# Patient Record
Sex: FEMALE | Race: BLACK OR AFRICAN AMERICAN | Employment: STUDENT | URBAN - METROPOLITAN AREA
[De-identification: names, ages, dates, MRNs, and addresses within clinical notes are randomized per-mention and may not be internally consistent; named-entity substitution may affect disease eponyms.]

---

## 2018-01-07 ENCOUNTER — HOSPITAL ENCOUNTER (EMERGENCY)
Age: 23
Discharge: HOME | End: 2018-01-07
Attending: EMERGENCY MEDICINE
Payer: COMMERCIAL

## 2018-01-07 VITALS
WEIGHT: 214.2 LBS | SYSTOLIC BLOOD PRESSURE: 121 MMHG | HEIGHT: 68 IN | RESPIRATION RATE: 16 BRPM | TEMPERATURE: 98.2 F | BODY MASS INDEX: 32.46 KG/M2 | OXYGEN SATURATION: 100 % | DIASTOLIC BLOOD PRESSURE: 90 MMHG | HEART RATE: 82 BPM

## 2018-01-07 DIAGNOSIS — N30.00 ACUTE CYSTITIS WITHOUT HEMATURIA: ICD-10-CM

## 2018-01-07 DIAGNOSIS — K29.90 GASTRITIS AND DUODENITIS: Primary | ICD-10-CM

## 2018-01-07 DIAGNOSIS — F10.10 ALCOHOL ABUSE: ICD-10-CM

## 2018-01-07 LAB
ALBUMIN SERPL-MCNC: 4.3 G/DL (ref 3.5–5)
ALBUMIN/GLOB SERPL: 1 {RATIO} (ref 1.1–2.2)
ALP SERPL-CCNC: 62 U/L (ref 45–117)
ALT SERPL-CCNC: 30 U/L (ref 12–78)
ANION GAP SERPL CALC-SCNC: 10 MMOL/L (ref 5–15)
APPEARANCE UR: ABNORMAL
AST SERPL-CCNC: 16 U/L (ref 15–37)
BACTERIA URNS QL MICRO: ABNORMAL /HPF
BASOPHILS # BLD: 0 K/UL (ref 0–0.1)
BASOPHILS NFR BLD: 0 % (ref 0–1)
BILIRUB SERPL-MCNC: 0.4 MG/DL (ref 0.2–1)
BILIRUB UR QL: NEGATIVE
BUN SERPL-MCNC: 12 MG/DL (ref 6–20)
BUN/CREAT SERPL: 13 (ref 12–20)
CALCIUM SERPL-MCNC: 9 MG/DL (ref 8.5–10.1)
CHLORIDE SERPL-SCNC: 104 MMOL/L (ref 97–108)
CO2 SERPL-SCNC: 24 MMOL/L (ref 21–32)
COLOR UR: ABNORMAL
CREAT SERPL-MCNC: 0.92 MG/DL (ref 0.55–1.02)
EOSINOPHIL # BLD: 0 K/UL (ref 0–0.4)
EOSINOPHIL NFR BLD: 0 % (ref 0–7)
EPITH CASTS URNS QL MICRO: ABNORMAL /LPF
ERYTHROCYTE [DISTWIDTH] IN BLOOD BY AUTOMATED COUNT: 12.4 % (ref 11.5–14.5)
GLOBULIN SER CALC-MCNC: 4.2 G/DL (ref 2–4)
GLUCOSE SERPL-MCNC: 108 MG/DL (ref 65–100)
GLUCOSE UR STRIP.AUTO-MCNC: NEGATIVE MG/DL
HCT VFR BLD AUTO: 40.3 % (ref 35–47)
HEMOCCULT STL QL: NEGATIVE
HGB BLD-MCNC: 13.8 G/DL (ref 11.5–16)
HGB UR QL STRIP: NEGATIVE
KETONES UR QL STRIP.AUTO: NEGATIVE MG/DL
LEUKOCYTE ESTERASE UR QL STRIP.AUTO: ABNORMAL
LYMPHOCYTES # BLD: 1.1 K/UL (ref 0.8–3.5)
LYMPHOCYTES NFR BLD: 9 % (ref 12–49)
MCH RBC QN AUTO: 31.1 PG (ref 26–34)
MCHC RBC AUTO-ENTMCNC: 34.2 G/DL (ref 30–36.5)
MCV RBC AUTO: 90.8 FL (ref 80–99)
MONOCYTES # BLD: 0.4 K/UL (ref 0–1)
MONOCYTES NFR BLD: 3 % (ref 5–13)
MUCOUS THREADS URNS QL MICRO: ABNORMAL /LPF
NEUTS SEG # BLD: 10 K/UL (ref 1.8–8)
NEUTS SEG NFR BLD: 88 % (ref 32–75)
NITRITE UR QL STRIP.AUTO: NEGATIVE
PH UR STRIP: 7.5 [PH] (ref 5–8)
PLATELET # BLD AUTO: 297 K/UL (ref 150–400)
POTASSIUM SERPL-SCNC: 3.9 MMOL/L (ref 3.5–5.1)
PROT SERPL-MCNC: 8.5 G/DL (ref 6.4–8.2)
PROT UR STRIP-MCNC: 30 MG/DL
RBC # BLD AUTO: 4.44 M/UL (ref 3.8–5.2)
RBC #/AREA URNS HPF: ABNORMAL /HPF (ref 0–5)
SODIUM SERPL-SCNC: 138 MMOL/L (ref 136–145)
SP GR UR REFRACTOMETRY: 1.03 (ref 1–1.03)
UR CULT HOLD, URHOLD: NORMAL
UROBILINOGEN UR QL STRIP.AUTO: 0.2 EU/DL (ref 0.2–1)
WBC # BLD AUTO: 11.6 K/UL (ref 3.6–11)
WBC URNS QL MICRO: ABNORMAL /HPF (ref 0–4)

## 2018-01-07 PROCEDURE — 85025 COMPLETE CBC W/AUTO DIFF WBC: CPT | Performed by: EMERGENCY MEDICINE

## 2018-01-07 PROCEDURE — 74011000250 HC RX REV CODE- 250: Performed by: EMERGENCY MEDICINE

## 2018-01-07 PROCEDURE — 87086 URINE CULTURE/COLONY COUNT: CPT | Performed by: EMERGENCY MEDICINE

## 2018-01-07 PROCEDURE — 36415 COLL VENOUS BLD VENIPUNCTURE: CPT | Performed by: EMERGENCY MEDICINE

## 2018-01-07 PROCEDURE — 96374 THER/PROPH/DIAG INJ IV PUSH: CPT

## 2018-01-07 PROCEDURE — 99283 EMERGENCY DEPT VISIT LOW MDM: CPT

## 2018-01-07 PROCEDURE — 96361 HYDRATE IV INFUSION ADD-ON: CPT

## 2018-01-07 PROCEDURE — 81001 URINALYSIS AUTO W/SCOPE: CPT | Performed by: EMERGENCY MEDICINE

## 2018-01-07 PROCEDURE — C9113 INJ PANTOPRAZOLE SODIUM, VIA: HCPCS | Performed by: EMERGENCY MEDICINE

## 2018-01-07 PROCEDURE — 74011250637 HC RX REV CODE- 250/637: Performed by: EMERGENCY MEDICINE

## 2018-01-07 PROCEDURE — 80053 COMPREHEN METABOLIC PANEL: CPT | Performed by: EMERGENCY MEDICINE

## 2018-01-07 PROCEDURE — 87147 CULTURE TYPE IMMUNOLOGIC: CPT | Performed by: EMERGENCY MEDICINE

## 2018-01-07 PROCEDURE — 82272 OCCULT BLD FECES 1-3 TESTS: CPT | Performed by: EMERGENCY MEDICINE

## 2018-01-07 PROCEDURE — 74011250636 HC RX REV CODE- 250/636: Performed by: EMERGENCY MEDICINE

## 2018-01-07 RX ORDER — CEPHALEXIN 500 MG/1
500 CAPSULE ORAL
Status: COMPLETED | OUTPATIENT
Start: 2018-01-07 | End: 2018-01-07

## 2018-01-07 RX ORDER — CEPHALEXIN 500 MG/1
500 CAPSULE ORAL 2 TIMES DAILY
Qty: 10 CAP | Refills: 0 | Status: SHIPPED | OUTPATIENT
Start: 2018-01-07 | End: 2020-09-27 | Stop reason: ALTCHOICE

## 2018-01-07 RX ADMIN — CEPHALEXIN 500 MG: 500 CAPSULE ORAL at 22:21

## 2018-01-07 RX ADMIN — SODIUM CHLORIDE 40 MG: 9 INJECTION INTRAMUSCULAR; INTRAVENOUS; SUBCUTANEOUS at 21:01

## 2018-01-07 RX ADMIN — SODIUM CHLORIDE 1000 ML: 900 INJECTION, SOLUTION INTRAVENOUS at 21:01

## 2018-01-08 NOTE — ED TRIAGE NOTES
Pt reports onset of vomiting, diarrhea, and bright red blood in stool today. C/o bilateral lower abd cramping, feeling faint and unable to keep any food/ fluids down.

## 2018-01-08 NOTE — ED NOTES
2150: MD and RN at bedside for stool occult  2225: MD reviewed discharge instructions and options with patient and patient verbalized understanding. RN reviewed discharge instructions using teachback method. Pt ambulated to exit without difficulty and in no signs of acute distress escorted by self who will drive home. No complaints or needs expressed at this time. Patient was counseled on medications prescribed at discharge. VSS at time of discharge. Pt to call GI in the morning for follow up.

## 2018-01-08 NOTE — DISCHARGE INSTRUCTIONS
Gastritis: Care Instructions  Your Care Instructions    Gastritis is a sore and upset stomach. It happens when something irritates the stomach lining. Many things can cause it. These include an infection such as the flu or something you ate or drank. Medicines or a sore on the lining of the stomach (ulcer) also can cause it. Your belly may bloat and ache. You may belch, vomit, and feel sick to your stomach. You should be able to relieve the problem by taking medicine. And it may help to change your diet. If gastritis lasts, your doctor may prescribe medicine. Follow-up care is a key part of your treatment and safety. Be sure to make and go to all appointments, and call your doctor if you are having problems. It's also a good idea to know your test results and keep a list of the medicines you take. How can you care for yourself at home? · If your doctor prescribed antibiotics, take them as directed. Do not stop taking them just because you feel better. You need to take the full course of antibiotics. · Be safe with medicines. If your doctor prescribed medicine to decrease stomach acid, take it as directed. Call your doctor if you think you are having a problem with your medicine. · Do not take any other medicine, including over-the-counter pain relievers, without talking to your doctor first.  · If your doctor recommends over-the-counter medicine to reduce stomach acid, such as Pepcid AC, Prilosec, Tagamet HB, or Zantac 75, follow the directions on the label. · Drink plenty of fluids (enough so that your urine is light yellow or clear like water) to prevent dehydration. Choose water and other caffeine-free clear liquids. If you have kidney, heart, or liver disease and have to limit fluids, talk with your doctor before you increase the amount of fluids you drink. · Limit how much alcohol you drink. · Avoid coffee, tea, cola drinks, chocolate, and other foods with caffeine.  They increase stomach acid.  When should you call for help? Call 911 anytime you think you may need emergency care. For example, call if:  ? · You vomit blood or what looks like coffee grounds. ? · You pass maroon or very bloody stools. ?Call your doctor now or seek immediate medical care if:  ? · You start breathing fast and have not produced urine in the last 8 hours. ? · You cannot keep fluids down. ? Watch closely for changes in your health, and be sure to contact your doctor if:  ? · You do not get better as expected. Where can you learn more? Go to http://juancarlos-flor.info/. Enter [id number] in the search box to learn more about \"Gastritis: Care Instructions. \"  Current as of: May 12, 2017  Content Version: 11.4  © 6874-1441 [employer]. Care instructions adapted under license by [employer] (which disclaims liability or warranty for this information). If you have questions about a medical condition or this instruction, always ask your healthcare professional. [address] any warranty or liability for your use of this information.

## 2018-01-08 NOTE — ED PROVIDER NOTES
HPI Comments: 25 y.o. female with past medical history significant for gastritis, depression, anxiety, and esophageal ulcer who presents with chief complaint of vomiting. The pt c/o nausea and vomiting that have been ongoing today with associated abdominal pain and 3 episodes of bright red bloody stool. The pt explains that she has a known history of gastritis and she had a endoscopy a few years ago for the same. The pt reports that she was treated for GERD after the endoscopy. The pt states that she was instructed to stop ETOH use after the endoscopy. The pt admits that she drank a large amount of tequila last night. There are no other acute medical concerns at this time. Social hx: Nonsmoker. Current ETOH use. PCP: Hamilton Snider MD    Note written by Connor Gifford, as dictated by Familia Bruno MD 8:40 PM     The history is provided by the patient. No  was used. Past Medical History:   Diagnosis Date    Anxiety     Depression     Esophageal ulcer     Gastrointestinal disorder     \"Colon problem as a kid\"       Past Surgical History:   Procedure Laterality Date    HX TYMPANOSTOMY           History reviewed. No pertinent family history. Social History     Social History    Marital status: SINGLE     Spouse name: N/A    Number of children: N/A    Years of education: N/A     Occupational History    Not on file. Social History Main Topics    Smoking status: Never Smoker    Smokeless tobacco: Never Used    Alcohol use Yes    Drug use: Not on file    Sexual activity: Not on file     Other Topics Concern    Not on file     Social History Narrative         ALLERGIES: Citrus and derivatives    Review of Systems   Constitutional: Negative for activity change, chills and fever. HENT: Negative for nosebleeds, sore throat, trouble swallowing and voice change. Eyes: Negative for visual disturbance. Respiratory: Negative for shortness of breath. Cardiovascular: Negative for chest pain and palpitations. Gastrointestinal: Positive for abdominal pain, blood in stool (3 episodes of bright red blood), nausea and vomiting. Negative for constipation and diarrhea. Genitourinary: Negative for difficulty urinating, dysuria, hematuria and urgency. Musculoskeletal: Negative for back pain, neck pain and neck stiffness. Skin: Negative for color change. Allergic/Immunologic: Negative for immunocompromised state. Neurological: Negative for dizziness, seizures, syncope, weakness, light-headedness, numbness and headaches. Psychiatric/Behavioral: Negative for behavioral problems, confusion, hallucinations, self-injury and suicidal ideas. All other systems reviewed and are negative. Vitals:    01/07/18 1919   BP: 141/86   Pulse: 73   Resp: 16   Temp: 98 °F (36.7 °C)   SpO2: 100%   Weight: 97.2 kg (214 lb 3.2 oz)   Height: 5' 8\" (1.727 m)            Physical Exam   Constitutional: She is oriented to person, place, and time. She appears well-developed and well-nourished. No distress. HENT:   Head: Normocephalic and atraumatic. Eyes: Pupils are equal, round, and reactive to light. Neck: Normal range of motion. Neck supple. Cardiovascular: Normal rate, regular rhythm and normal heart sounds. Exam reveals no gallop and no friction rub. No murmur heard. Pulmonary/Chest: Effort normal and breath sounds normal. No respiratory distress. She has no wheezes. Abdominal: Soft. Bowel sounds are normal. She exhibits no distension. There is tenderness (diffuse). There is no rebound and no guarding. Genitourinary:   Genitourinary Comments: No nan blood on the rectal exam.   Musculoskeletal: Normal range of motion. Neurological: She is alert and oriented to person, place, and time. Skin: Skin is warm. No rash noted. She is not diaphoretic. Psychiatric: She has a normal mood and affect.  Her behavior is normal. Judgment and thought content normal. Nursing note and vitals reviewed. Note written by Connor Valadez, as dictated by Delia Rueda MD 8:40 PM     [facility]  ED Course     This is a 69-year-old female with past medical history, review of systems, physical exam as above, presenting with complaints of abdominal pain, nausea, vomiting, regular blood per rectum, following an episode of binge alcohol use last night. The patient endorses a previous history of gastritis, previous endoscopy, physical exam remarkable for well-appearing female, with mildly diffuse tenderness to palpation, without rebound or guarding. Plan to provide IV fluids, antiemetics, obtain CMP, CBC, UA, perform rectal exam. We will make a disposition based on the patient's diagnostics and response to therapy. Procedures    10:17 PM  Patient with unremarkable rectal exam, lab work wnl. Likely gastritis exacerbation 2/2 alcohol use. Will DC patient home with GI follow up, return precautions, and ETOH counseling.

## 2018-01-09 LAB
BACTERIA SPEC CULT: NORMAL
CC UR VC: NORMAL
SERVICE CMNT-IMP: NORMAL

## 2020-05-22 ENCOUNTER — HOSPITAL ENCOUNTER (EMERGENCY)
Age: 25
Discharge: HOME | End: 2020-05-22
Attending: STUDENT IN AN ORGANIZED HEALTH CARE EDUCATION/TRAINING PROGRAM
Payer: COMMERCIAL

## 2020-05-22 VITALS
OXYGEN SATURATION: 100 % | BODY MASS INDEX: 25.46 KG/M2 | HEART RATE: 93 BPM | WEIGHT: 168 LBS | HEIGHT: 68 IN | RESPIRATION RATE: 16 BRPM | TEMPERATURE: 99.2 F

## 2020-05-22 DIAGNOSIS — K14.9 TONGUE IRRITATION: Primary | ICD-10-CM

## 2020-05-22 PROCEDURE — 99282 EMERGENCY DEPT VISIT SF MDM: CPT

## 2020-05-22 PROCEDURE — 74011250637 HC RX REV CODE- 250/637: Performed by: NURSE PRACTITIONER

## 2020-05-22 RX ORDER — DEXAMETHASONE SODIUM PHOSPHATE 10 MG/ML
10 INJECTION INTRAMUSCULAR; INTRAVENOUS
Status: COMPLETED | OUTPATIENT
Start: 2020-05-22 | End: 2020-05-22

## 2020-05-22 RX ADMIN — DEXAMETHASONE SODIUM PHOSPHATE 10 MG: 10 INJECTION, SOLUTION INTRAMUSCULAR; INTRAVENOUS at 15:38

## 2020-05-22 NOTE — ED TRIAGE NOTES
Pt states the right side of her tongue has been swollen x 5 days associated with itching on right side of throat. Pt was seen at [facility] for the same. Pt speaking in full sentences without muffled voice and able to handle own secretions.

## 2020-05-22 NOTE — ED PROVIDER NOTES
This is a 52-year-old female with past medical history of anxiety/depression and esophageal ulcer who presents to the ER today for evaluation of right sided tongue \"numbness \" and sore throat. Patient stated that her symptoms started several days ago, when she presented to an urgent care for the evaluation of her symptoms. She stated that they gave her a prescription for amoxicillin and Benadryl for her symptoms. She reports being on day 3, with mild resolution of her discomfort. She specifically denies loss of taste and agnosia, but that the right side of her tongue still feels tingly. She denies having history of similar symptoms, and denies any trauma or injury to the tongue, including biting or exposure to hot or cold liquids. Her only other complaint is a mild gritty sensation in her right eye. She denies visual changes, exudate, injury, diplopia, or sensation of a foreign body. She also denies fever, chills, pain or difficulty swallowing, abdominal pain, and skin rash           Past Medical History:   Diagnosis Date    Anxiety     Depression     Esophageal ulcer     Gastrointestinal disorder     \"Colon problem as a kid\"       Past Surgical History:   Procedure Laterality Date    HX TYMPANOSTOMY           No family history on file. Social History     Socioeconomic History    Marital status: SINGLE     Spouse name: Not on file    Number of children: Not on file    Years of education: Not on file    Highest education level: Not on file   Occupational History    Not on file   Social Needs    Financial resource strain: Not on file    Food insecurity     Worry: Not on file     Inability: Not on file    Transportation needs     Medical: Not on file     Non-medical: Not on file   Tobacco Use    Smoking status: Never Smoker    Smokeless tobacco: Never Used   Substance and Sexual Activity    Alcohol use:  Yes    Drug use: Not on file    Sexual activity: Not on file   Lifestyle    Physical activity     Days per week: Not on file     Minutes per session: Not on file    Stress: Not on file   Relationships    Social connections     Talks on phone: Not on file     Gets together: Not on file     Attends Spiritism service: Not on file     Active member of club or organization: Not on file     Attends meetings of clubs or organizations: Not on file     Relationship status: Not on file    Intimate partner violence     Fear of current or ex partner: Not on file     Emotionally abused: Not on file     Physically abused: Not on file     Forced sexual activity: Not on file   Other Topics Concern    Not on file   Social History Narrative    Not on file         ALLERGIES: Citrus and derivatives    Review of Systems   Constitutional: Negative. HENT: Positive for rhinorrhea and sore throat. Negative for congestion, dental problem, ear discharge, ear pain, facial swelling, hearing loss, mouth sores, sinus pain and trouble swallowing. Eyes: Positive for itching. Negative for photophobia, pain, discharge, redness and visual disturbance. Respiratory: Negative for chest tightness and shortness of breath. Cardiovascular: Negative. Gastrointestinal: Negative. Endocrine: Negative. Genitourinary: Negative. Musculoskeletal: Negative. Neurological: Negative. Vitals:    05/22/20 1445   Pulse: 93   Resp: 16   Temp: 99.2 °F (37.3 °C)   SpO2: 100%   Weight: 76.2 kg (168 lb)   Height: 5' 8\" (1.727 m)            Physical Exam  Vitals signs and nursing note reviewed. Constitutional:       Appearance: She is well-developed and normal weight. HENT:      Right Ear: Tympanic membrane normal.      Left Ear: Tympanic membrane normal.      Nose: Rhinorrhea present. Mouth/Throat:      Pharynx: Oropharynx is clear. Uvula midline. Tonsils: 2+ on the right. 2+ on the left. Eyes:      Conjunctiva/sclera: Conjunctivae normal.      Pupils: Pupils are equal, round, and reactive to light. Neck:      Musculoskeletal: Normal range of motion and neck supple. Thyroid: No thyromegaly. Cardiovascular:      Rate and Rhythm: Normal rate and regular rhythm. Pulmonary:      Effort: Pulmonary effort is normal.      Breath sounds: Normal breath sounds. Abdominal:      General: Bowel sounds are normal.      Palpations: Abdomen is soft. Lymphadenopathy:      Cervical: No cervical adenopathy. Skin:     General: Skin is warm and dry. Neurological:      General: No focal deficit present. Mental Status: She is alert. Psychiatric:         Mood and Affect: Mood normal.         Behavior: Behavior normal.          MDM        VITAL SIGNS:    Patient Vitals for the past 12 hrs:   Temp Pulse Resp BP SpO2   05/22/20 1445 99.2 °F (37.3 °C) 93 16  100 %         Medications During Visit:  Medications   dexamethasone (PF) (DECADRON) 10 mg/mL injection 10 mg (10 mg Oral Given 5/22/20 1538)       DECISION MAKING:  Sonya Chin is a 26 y.o. female who comes in as above. Her chief complaint is related to sensory disturbances felt on the right side of her tongue. She states that the right side of her tongue feels \"numb\", however, she was able to differentiate sharp versus dull tactile stimulation on the affected area. She explicitly denied agnosia or loss of taste, making an atypical COVID-19 infection very unlikely. She also reported a gritty feeling in the right eye, which points more towards the diagnosis of a viral conjunctivitis/viral ENT syndrome. She had no cervical adenopathy and her oropharynx was mildly erythematous without any patches, exudate, or visualized peritonsillar abscess or displaced uvula. She also exhibited no odynophagia or dysphagia. She agreed to the treatment plan of continuing to take her prescribed amoxicillin and taking one-time dose of Decadron in the emergency department today.   I instructed her to follow-up with her primary care doctor at the earliest time she can make an appointment, seeing as I could not give her a definitive answer for the abnormal sensation on her tongue. However, based on my history and physical exam, I do not think any other tests would have benefited the patient in a diagnostic or prognostic way. IMPRESSION:  1. Tongue irritation        DISPOSITION:  Discharged      Discharge Medication List as of 5/22/2020  4:02 PM           Follow-up Information     Follow up With Specialties Details Why Contact Info    Maritza Holter, PA-C Physician Assistant  As needed, If symptoms worsen [address] [place] [address] [phone]            The patient is asked to follow-up with their primary care provider in the next several days. They are to call tomorrow for an appointment. The patient is asked to return promptly for any increased concerns or worsening of symptoms. They can return to this emergency department or any other emergency department.

## 2020-05-22 NOTE — DISCHARGE INSTRUCTIONS
We gave you a \"steroid\" that can help with inflammation for several days. Please finish your Amoxicillin as prescribed. Please take motrin or tylenol as needed for discomfort / pain. Rinse mouth with warm salt water daily.

## 2020-07-30 LAB
ANTIBODY SCREEN, EXTERNAL: NEGATIVE
CHLAMYDIA, EXTERNAL: NEGATIVE
HBSAG, EXTERNAL: NEGATIVE
HIV, EXTERNAL: NEGATIVE
N. GONORRHEA, EXTERNAL: NEGATIVE
RUBELLA, EXTERNAL: NORMAL
T. PALLIDUM, EXTERNAL: NEGATIVE
TYPE, ABO & RH, EXTERNAL: NORMAL

## 2020-09-27 ENCOUNTER — HOSPITAL ENCOUNTER (EMERGENCY)
Age: 25
Discharge: HOME | End: 2020-09-27
Attending: EMERGENCY MEDICINE | Admitting: EMERGENCY MEDICINE
Payer: COMMERCIAL

## 2020-09-27 VITALS
TEMPERATURE: 98.2 F | DIASTOLIC BLOOD PRESSURE: 76 MMHG | SYSTOLIC BLOOD PRESSURE: 132 MMHG | WEIGHT: 182.5 LBS | OXYGEN SATURATION: 100 % | HEART RATE: 92 BPM | HEIGHT: 67 IN | BODY MASS INDEX: 28.64 KG/M2 | RESPIRATION RATE: 16 BRPM

## 2020-09-27 DIAGNOSIS — N89.8 VAGINAL DISCHARGE DURING PREGNANCY, ANTEPARTUM: Primary | ICD-10-CM

## 2020-09-27 DIAGNOSIS — N76.0 BV (BACTERIAL VAGINOSIS): ICD-10-CM

## 2020-09-27 DIAGNOSIS — O26.899 VAGINAL DISCHARGE DURING PREGNANCY, ANTEPARTUM: Primary | ICD-10-CM

## 2020-09-27 DIAGNOSIS — B96.89 BV (BACTERIAL VAGINOSIS): ICD-10-CM

## 2020-09-27 LAB
APPEARANCE UR: CLEAR
BACTERIA URNS QL MICRO: NEGATIVE /HPF
BILIRUB UR QL: NEGATIVE
CLUE CELLS VAG QL WET PREP: NORMAL
COLOR UR: NORMAL
EPITH CASTS URNS QL MICRO: NORMAL /LPF
GLUCOSE UR STRIP.AUTO-MCNC: NEGATIVE MG/DL
HGB UR QL STRIP: NEGATIVE
HYALINE CASTS URNS QL MICRO: NORMAL /LPF (ref 0–5)
KETONES UR QL STRIP.AUTO: NEGATIVE MG/DL
KOH PREP SPEC: NORMAL
LEUKOCYTE ESTERASE UR QL STRIP.AUTO: NEGATIVE
NITRITE UR QL STRIP.AUTO: NEGATIVE
PH UR STRIP: 5.5 [PH] (ref 5–8)
PROT UR STRIP-MCNC: NEGATIVE MG/DL
RBC #/AREA URNS HPF: NORMAL /HPF (ref 0–5)
SERVICE CMNT-IMP: NORMAL
SP GR UR REFRACTOMETRY: 1.02 (ref 1–1.03)
T VAGINALIS VAG QL WET PREP: NORMAL
UR CULT HOLD, URHOLD: NORMAL
UROBILINOGEN UR QL STRIP.AUTO: 0.2 EU/DL (ref 0.2–1)
WBC URNS QL MICRO: NORMAL /HPF (ref 0–4)

## 2020-09-27 PROCEDURE — 99283 EMERGENCY DEPT VISIT LOW MDM: CPT

## 2020-09-27 PROCEDURE — 87210 SMEAR WET MOUNT SALINE/INK: CPT

## 2020-09-27 PROCEDURE — 87491 CHLMYD TRACH DNA AMP PROBE: CPT

## 2020-09-27 PROCEDURE — 36415 COLL VENOUS BLD VENIPUNCTURE: CPT

## 2020-09-27 PROCEDURE — 81001 URINALYSIS AUTO W/SCOPE: CPT

## 2020-09-28 LAB
C TRACH DNA SPEC QL NAA+PROBE: NEGATIVE
N GONORRHOEA DNA SPEC QL NAA+PROBE: NEGATIVE
SAMPLE TYPE: NORMAL
SERVICE CMNT-IMP: NORMAL
SPECIMEN SOURCE: NORMAL

## 2020-09-28 NOTE — ED PROVIDER NOTES
Meghan Galvez is a 26 y.o. (ab) female 16 weeks pregnant, with no significantPMH presents to emergency room ambulatory for evaluation of low back pain, green vaginal discharge, without odor x today. She denies fever, chills, vomiting, urinary sx's. No concerns for STI, no vaginal bleeding. Has had intermittent BV this pregnancy, states having an odor, on Metrogel twice a week \"for the duration of my pregnancy\", last dose Wed, due for another dose tonight. States discharge tonight had no odor. Also tested for micoplasma and ureaplasma, states given a \"one time dose\", states she thinks she took azithromycin on . She is being actively followed by OB on a 1-2 weekly basis for the above problems and has follow-up this coming week. OB: Christiana Gandhi  PCP: Leonor, [address] [place] [address]    Surgical hx- see chart  Social hx- see chart    The patient has no other complaints at this time. Past Medical History:   Diagnosis Date    Anxiety     Depression     Esophageal ulcer     Gastrointestinal disorder     \"Colon problem as a kid\"       Past Surgical History:   Procedure Laterality Date    HX TYMPANOSTOMY           History reviewed. No pertinent family history. Social History     Socioeconomic History    Marital status: SINGLE     Spouse name: Not on file    Number of children: Not on file    Years of education: Not on file    Highest education level: Not on file   Occupational History    Not on file   Social Needs    Financial resource strain: Not on file    Food insecurity     Worry: Not on file     Inability: Not on file    Transportation needs     Medical: Not on file     Non-medical: Not on file   Tobacco Use    Smoking status: Never Smoker    Smokeless tobacco: Never Used   Substance and Sexual Activity    Alcohol use:  Yes    Drug use: Not on file    Sexual activity: Not on file   Lifestyle    Physical activity     Days per week: Not on file     Minutes per session: Not on file    Stress: Not on file   Relationships    Social connections     Talks on phone: Not on file     Gets together: Not on file     Attends Sabianist service: Not on file     Active member of club or organization: Not on file     Attends meetings of clubs or organizations: Not on file     Relationship status: Not on file    Intimate partner violence     Fear of current or ex partner: Not on file     Emotionally abused: Not on file     Physically abused: Not on file     Forced sexual activity: Not on file   Other Topics Concern    Not on file   Social History Narrative    Not on file         ALLERGIES: Citrus and derivatives and Seafood    Review of Systems   Constitutional: Negative. Negative for activity change, chills, fatigue and unexpected weight change. HENT: Negative for trouble swallowing. Respiratory: Negative for cough, chest tightness, shortness of breath and wheezing. Cardiovascular: Negative. Negative for chest pain and palpitations. Gastrointestinal: Negative. Negative for abdominal pain, diarrhea, nausea and vomiting. Genitourinary: Positive for vaginal discharge. Negative for dysuria, flank pain, frequency and hematuria. Musculoskeletal: Negative. Negative for arthralgias, back pain, neck pain and neck stiffness. Skin: Negative. Negative for color change and rash. Neurological: Negative. Negative for dizziness, numbness and headaches. All other systems reviewed and are negative. Vitals:    09/27/20 2029   BP: 130/84   Pulse: 97   Resp: 16   Temp: 98.3 °F (36.8 °C)   SpO2: 100%   Weight: 82.8 kg (182 lb 8 oz)   Height: 5' 7\" (1.702 m)            Physical Exam  Vitals signs and nursing note reviewed. Constitutional:       General: She is not in acute distress. Appearance: She is well-developed. She is not toxic-appearing or diaphoretic. Comments: AA female, well-appearing   HENT:      Head: Normocephalic and atraumatic.    Eyes:      General: Right eye: No discharge. Left eye: No discharge. Conjunctiva/sclera: Conjunctivae normal.      Pupils: Pupils are equal, round, and reactive to light. Neck:      Musculoskeletal: Full passive range of motion without pain and normal range of motion. Trachea: No tracheal tenderness. Cardiovascular:      Rate and Rhythm: Normal rate and regular rhythm. Pulses: Normal pulses. Heart sounds: Normal heart sounds. No murmur. No friction rub. No gallop. Pulmonary:      Effort: Pulmonary effort is normal. No respiratory distress. Breath sounds: Normal breath sounds. No wheezing or rales. Chest:      Chest wall: No tenderness. Abdominal:      General: Bowel sounds are normal. There is no distension. Palpations: Abdomen is soft. Tenderness: There is no abdominal tenderness. There is no guarding or rebound. Comments: Gravid just below umbilicus; abd soft and non-tender   Genitourinary:     Comments: Normal external genitalia. Vagina- pink, moist without lesion. MOderate amount of thick white creamy discharge. Cervix- pink, round with a closed cervical os. No CMT. No adnexal TTP or masses. Musculoskeletal: Normal range of motion. General: No tenderness. Skin:     General: Skin is warm and dry. Capillary Refill: Capillary refill takes less than 2 seconds. Findings: No abrasion, erythema or rash. Neurological:      Mental Status: She is alert and oriented to person, place, and time. Cranial Nerves: No cranial nerve deficit. Sensory: No sensory deficit.       Coordination: Coordination normal.   Psychiatric:         Speech: Speech normal.         Behavior: Behavior normal.          MDM  Number of Diagnoses or Management Options  BV (bacterial vaginosis):   Vaginal discharge during pregnancy, antepartum:   Diagnosis management comments:   Ddx: UTI, PID, STI, BV, yeast infection       Amount and/or Complexity of Data Reviewed  Review and summarize past medical records: yes    Patient Progress  Patient progress: stable         Procedures    I discussed patient's PMH, exam findings as well as careplan with the ER attending who agrees with care plan. Kaycee Najera PA-C    Procedure Note - Pelvic Exam:    10:29 PM  Performed by: Kaycee Najera PA-C  Chaperoned by: primary nurse  Pelvic exam was performed using bimanual and speculum. Further findings noted in physical exam.   The procedure took 1-15 minutes, and pt tolerated well.    + BV, admits to chronic BV during this pregnancy, being actively managed by OB for this, on Metrogel twice a week, due for another dose today (last dose was Wed) and has close f/u with OB this week. UA without bacteria or other signs of infection. No CMT on exam, defers offer for empiric treatment for STI, will await cultures. No fever, vomiting.  per nurse. No vaginal bleeding , cramping or pain. Plan for her to call OB later today to discuss further management. Discussed with Dr. Giovanny Rangel who agrees with care plan and okay with discharge. LABORATORY TESTS:  Recent Results (from the past 12 hour(s))   URINALYSIS W/MICROSCOPIC    Collection Time: 09/27/20 10:13 PM   Result Value Ref Range    Color YELLOW/STRAW      Appearance CLEAR CLEAR      Specific gravity 1.018 1.003 - 1.030      pH (UA) 5.5 5.0 - 8.0      Protein Negative NEG mg/dL    Glucose Negative NEG mg/dL    Ketone Negative NEG mg/dL    Bilirubin Negative NEG      Blood Negative NEG      Urobilinogen 0.2 0.2 - 1.0 EU/dL    Nitrites Negative NEG      Leukocyte Esterase Negative NEG      WBC 0-4 0 - 4 /hpf    RBC 0-5 0 - 5 /hpf    Epithelial cells FEW FEW /lpf    Bacteria Negative NEG /hpf    Hyaline cast 0-2 0 - 5 /lpf   URINE CULTURE HOLD SAMPLE    Collection Time: 09/27/20 10:13 PM    Specimen: Serum; Urine   Result Value Ref Range    Urine culture hold        Urine on hold in Microbiology dept for 2 days.   If unpreserved urine is submitted, it cannot be used for addtional testing after 24 hours, recollection will be required. WET PREP    Collection Time: 09/27/20 10:13 PM    Specimen: Miscellaneous sample   Result Value Ref Range    Clue cells CLUE CELLS PRESENT      Wet prep NO TRICHOMONAS SEEN     KOH, OTHER SOURCES    Collection Time: 09/27/20 10:13 PM    Specimen: Vaginal Specimen; Other   Result Value Ref Range    Special Requests: NO SPECIAL REQUESTS      KOH NO YEAST SEEN             DISCHARGE NOTE:  11:29 PM  The patient's results have been reviewed with them and/or available family. Patient and/or family verbally conveyed their understanding and agreement of the patient's signs, symptoms, diagnosis, treatment and prognosis and additionally agree to follow up as recommended in the discharge instructions or to return to the Emergency Room should their condition change prior to their follow-up appointment. The patient/family verbally agrees with the care-plan and verbally conveys that all of their questions have been answered. The discharge instructions have also been provided to the patient and/or family with some educational information regarding the patient's diagnosis as well a list of reasons why the patient would want to return to the ER prior to their follow-up appointment, should their condition change. Plan:  1. F/U with OB tomorrow  2. Continue Metrogel as recommended by OB  3.  Return precautions discussed and advised to return to ER if worse

## 2020-09-28 NOTE — DISCHARGE INSTRUCTIONS
Patient Education        Bacterial Vaginosis: Care Instructions  Overview     Bacterial vaginosis is a type of vaginal infection. It is caused by excess growth of certain bacteria that are normally found in the vagina. Symptoms can include itching, swelling, pain when you urinate or have sex, and a gray or yellow discharge with a \"fishy\" odor. It is not considered an infection that is spread through sexual contact. Symptoms can be annoying and uncomfortable. But bacterial vaginosis does not usually cause other health problems. However, if you have it while you are pregnant, it can cause complications. While the infection may go away on its own, most doctors use antibiotics to treat it. You may have been prescribed pills or vaginal cream. With treatment, bacterial vaginosis usually clears up in 5 to 7 days. Follow-up care is a key part of your treatment and safety. Be sure to make and go to all appointments, and call your doctor if you are having problems. It's also a good idea to know your test results and keep a list of the medicines you take. How can you care for yourself at home? · Take your antibiotics as directed. Do not stop taking them just because you feel better. You need to take the full course of antibiotics. · Do not eat or drink anything that contains alcohol if you are taking metronidazole or tinidazole. · Keep using your medicine if you start your period. Use pads instead of tampons while using a vaginal cream or suppository. Tampons can absorb the medicine. · Wear loose cotton clothing. Do not wear nylon and other materials that hold body heat and moisture close to the skin. · Do not scratch. Relieve itching with a cold pack or a cool bath. · Do not wash your vaginal area more than once a day. Use plain water or a mild, unscented soap. Do not douche. When should you call for help?   Watch closely for changes in your health, and be sure to contact your doctor if:    · You have unexpected vaginal bleeding.     · You have a fever.     · You have new or increased pain in your vagina or pelvis.     · You are not getting better after 1 week.     · Your symptoms return after you finish the course of your medicine. Where can you learn more? Go to http://juancarlos-flor.info/  Enter X360 in the search box to learn more about \"Bacterial Vaginosis: Care Instructions. \"  Current as of: November 8, 2019               Content Version: 12.6  © 9352-9013 [employer]. Care instructions adapted under license by [employer] (which disclaims liability or warranty for this information). If you have questions about a medical condition or this instruction, always ask your healthcare professional. [address] any warranty or liability for your use of this information.

## 2020-09-28 NOTE — ED TRIAGE NOTES
Patient arrives to the [facility] with c/o lower back pain x 2 day and green vaginal discharge x 1 hr, patient is 16 weeks pregnant.

## 2020-10-31 ENCOUNTER — HOSPITAL ENCOUNTER (EMERGENCY)
Age: 25
Discharge: HOME | End: 2020-10-31
Admitting: OBSTETRICS & GYNECOLOGY
Payer: COMMERCIAL

## 2020-10-31 VITALS
HEART RATE: 91 BPM | DIASTOLIC BLOOD PRESSURE: 66 MMHG | SYSTOLIC BLOOD PRESSURE: 119 MMHG | RESPIRATION RATE: 16 BRPM | TEMPERATURE: 98.2 F

## 2020-10-31 DIAGNOSIS — O36.8121 DECREASED FETAL MOVEMENT AFFECTING MANAGEMENT OF PREGNANCY IN SECOND TRIMESTER, FETUS 1: ICD-10-CM

## 2020-10-31 DIAGNOSIS — Z34.92 FETAL HEART TONES PRESENT, SECOND TRIMESTER: ICD-10-CM

## 2020-10-31 DIAGNOSIS — Z3A.21 21 WEEKS GESTATION OF PREGNANCY: Primary | ICD-10-CM

## 2020-10-31 PROCEDURE — 99282 EMERGENCY DEPT VISIT SF MDM: CPT

## 2020-10-31 RX ORDER — METRONIDAZOLE 7.5 MG/G
GEL VAGINAL
COMMUNITY
End: 2020-12-20

## 2020-11-01 NOTE — PROGRESS NOTES
2235: Pt arrived to [facility] with complaint of decreased fetal movement. Pt states she has felt baby move since 18 weeks gestation, but hasn't felt baby move since this morning. Pt states her pregnancy has only been complicated by BV.      4422: Fetal heart tones observed via doppler. /150bpm noted    2239: Mae Riedel, CNM called to bedside    2240: Mae Riedel, CNM at pt bedside for assessment. CNM discussing fetal movement at this gestation and reassuring FHT. Pt given opportunity to ask questions and verbalized understanding of education. 2300: This RN at bedside discussing discharge instructions with pt. Instructions included kick counts and when to call the OB and the importance of keeping upcoming prenatal visits. Pt given opportunity to ask questions and verbalized understanding of discharge. Pt e-signed discharge instructions. 2312: Pt walking off unit in stable condition.

## 2020-11-01 NOTE — DISCHARGE INSTRUCTIONS
Patient Education   Patient Education        Counting Your Baby's Kicks: Care Instructions  Your Care Instructions     Counting your baby's kicks is one way your doctor can tell that your baby is healthy. Most women--especially in a first pregnancy--feel their baby move for the first time between 16 and 22 weeks. The movement may feel like flutters rather than kicks. Your baby may move more at certain times of the day. When you are active, you may notice less kicking than when you are resting. At your prenatal visits, your doctor will ask whether the baby is active. In your last trimester, your doctor may ask you to count the number of times you feel your baby move. Follow-up care is a key part of your treatment and safety. Be sure to make and go to all appointments, and call your doctor if you are having problems. It's also a good idea to know your test results and keep a list of the medicines you take. How do you count fetal kicks? · A common method of checking your baby's movement is to count the number of kicks or moves you feel in 1 hour. Ten movements (such as kicks, flutters, or rolls) in 1 hour are normal. Some doctors suggest that you count in the morning until you get to 10 movements. Then you can quit for that day and start again the next day. · Pick your baby's most active time of day to count. This may be any time from morning to evening. · If you do not feel 10 movements in an hour, your baby may be sleeping. Wait for the next hour and count again. When should you call for help? Call your doctor now or seek immediate medical care if:    · You noticed that your baby has stopped moving or is moving much less than normal.   Watch closely for changes in your health, and be sure to contact your doctor if you have any problems. Where can you learn more?   Go to http://juancarlos-flor.info/  Enter [id number] in the search box to learn more about \"Counting Your Baby's Kicks: Care Instructions. \"  Current as of: February 11, 2020               Content Version: 12.6  © 3152-9206 [employer]. Care instructions adapted under license by [employer] (which disclaims liability or warranty for this information). If you have questions about a medical condition or this instruction, always ask your healthcare professional. [address] any warranty or liability for your use of this information. Learning About When to Call Your Doctor During Pregnancy (After 20 Weeks)  Your Care Instructions  It's common to have concerns about what might be a problem during pregnancy. Although most pregnant women don't have any serious problems, it's important to know when to call your doctor if you have certain symptoms or signs of labor. These are general suggestions. Your doctor may give you some more information about when to call. When to call your doctor (after 20 weeks)  Call 911 anytime you think you may need emergency care. For example, call if:  · You have severe vaginal bleeding. · You have sudden, severe pain in your belly. · You passed out (lost consciousness). · You have a seizure. · You see or feel the umbilical cord. · You think you are about to deliver your baby and can't make it safely to the hospital.  Call your doctor now or seek immediate medical care if:  · You have vaginal bleeding. · You have belly pain. · You have a fever. · You have symptoms of preeclampsia, such as:  ? Sudden swelling of your face, hands, or feet. ? New vision problems (such as dimness, blurring, or seeing spots). ? A severe headache. · You have a sudden release of fluid from your vagina. (You think your water broke.)  · You think that you may be in labor. This means that you've had at least 6 contractions in an hour. · You notice that your baby has stopped moving or is moving much less than normal.  · You have symptoms of a urinary tract infection.  These may include:  ? Pain or burning when you urinate. ? A frequent need to urinate without being able to pass much urine. ? Pain in the flank, which is just below the rib cage and above the waist on either side of the back. ? Blood in your urine. Watch closely for changes in your health, and be sure to contact your doctor if:  · You have vaginal discharge that smells bad. · You have skin changes, such as:  ? A rash. ? Itching. ? Yellow color to your skin. · You have other concerns about your pregnancy. If you have labor signs at 37 weeks or more  If you have signs of labor at 37 weeks or more, your doctor may tell you to call when your labor becomes more active. Symptoms of active labor include:  · Contractions that are regular. · Contractions that are less than 5 minutes apart. · Contractions that are hard to talk through. Follow-up care is a key part of your treatment and safety. Be sure to make and go to all appointments, and call your doctor if you are having problems. It's also a good idea to know your test results and keep a list of the medicines you take. Where can you learn more? Go to http://www.gray.com/  Enter N531 in the search box to learn more about \"Learning About When to Call Your Doctor During Pregnancy (After 20 Weeks). \"  Current as of: February 11, 2020               Content Version: 12.6  © 5531-6599 [email or web address]. Care instructions adapted under license by [employer] (which disclaims liability or warranty for this information). If you have questions about a medical condition or this instruction, always ask your healthcare professional. [place] [address] any warranty or liability for your use of this information.
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
## 2020-11-06 ENCOUNTER — HOSPITAL ENCOUNTER (EMERGENCY)
Age: 25
Discharge: HOME | End: 2020-11-06
Payer: COMMERCIAL

## 2020-11-06 ENCOUNTER — HOSPITAL ENCOUNTER (EMERGENCY)
Age: 25
Discharge: STILL ADMITTED OR ADMINISTRATIVE | End: 2020-11-06
Attending: EMERGENCY MEDICINE

## 2020-11-06 VITALS
SYSTOLIC BLOOD PRESSURE: 121 MMHG | WEIGHT: 187 LBS | RESPIRATION RATE: 18 BRPM | HEIGHT: 67 IN | TEMPERATURE: 98.2 F | HEART RATE: 88 BPM | DIASTOLIC BLOOD PRESSURE: 62 MMHG | BODY MASS INDEX: 29.35 KG/M2

## 2020-11-06 LAB
APPEARANCE UR: CLEAR
BACTERIA URNS QL MICRO: NEGATIVE /HPF
BILIRUB UR QL: NEGATIVE
COLOR UR: ABNORMAL
EPITH CASTS URNS QL MICRO: ABNORMAL /LPF
GLUCOSE UR STRIP.AUTO-MCNC: NEGATIVE MG/DL
HGB UR QL STRIP: NEGATIVE
HYALINE CASTS URNS QL MICRO: ABNORMAL /LPF (ref 0–5)
KETONES UR QL STRIP.AUTO: 15 MG/DL
LEUKOCYTE ESTERASE UR QL STRIP.AUTO: NEGATIVE
NITRITE UR QL STRIP.AUTO: NEGATIVE
PH UR STRIP: 6 [PH] (ref 5–8)
PROT UR STRIP-MCNC: NEGATIVE MG/DL
RBC #/AREA URNS HPF: ABNORMAL /HPF (ref 0–5)
SP GR UR REFRACTOMETRY: 1.01 (ref 1–1.03)
UA: UC IF INDICATED,UAUC: ABNORMAL
UROBILINOGEN UR QL STRIP.AUTO: 0.2 EU/DL (ref 0.2–1)
WBC URNS QL MICRO: ABNORMAL /HPF (ref 0–4)

## 2020-11-06 PROCEDURE — 99284 EMERGENCY DEPT VISIT MOD MDM: CPT

## 2020-11-06 PROCEDURE — 87086 URINE CULTURE/COLONY COUNT: CPT

## 2020-11-06 PROCEDURE — 81001 URINALYSIS AUTO W/SCOPE: CPT

## 2020-11-06 RX ORDER — ASCORBIC ACID 250 MG
TABLET ORAL
COMMUNITY

## 2020-11-06 RX ORDER — NITROFURANTOIN 25; 75 MG/1; MG/1
100 CAPSULE ORAL 2 TIMES DAILY
Qty: 14 CAP | Refills: 0 | Status: SHIPPED | OUTPATIENT
Start: 2020-11-06 | End: 2020-11-13

## 2020-11-07 NOTE — DISCHARGE INSTRUCTIONS
Patient Education   Patient Education   Patient Education   Patient Education        Weeks 22 to 32 of Your Pregnancy: Care Instructions  Your Care Instructions     As you enter your 7th month of pregnancy at week 26, your baby's lungs are growing stronger and getting ready to breathe. You may notice that your baby responds to the sound of your or your partner's voice. You may also notice that your baby does less turning and twisting and more squirming or jerking. Jerking often means that your baby has the hiccups. Hiccups are perfectly normal and are only temporary. You may want to think about attending a childbirth preparation class. This is also a good time to start thinking about whether you want to have pain medicine during labor. Most pregnant women are tested for gestational diabetes between weeks 25 and 28. Gestational diabetes occurs when your blood sugar level gets too high when you're pregnant. The test is important, because you can have gestational diabetes and not know it. But the condition can cause problems for your baby. Follow-up care is a key part of your treatment and safety. Be sure to make and go to all appointments, and call your doctor if you are having problems. It's also a good idea to know your test results and keep a list of the medicines you take. How can you care for yourself at home? Ease discomfort from your baby's kicking  · Change your position. Sometimes this will cause your baby to change position too. · Take a deep breath while you raise your arm over your head. Then breathe out while you drop your arm. Do Kegel exercises to prevent urine from leaking  · You can do Kegel exercises while you stand or sit. ? Squeeze the same muscles you would use to stop your urine. Your belly and thighs should not move. ? Hold the squeeze for 3 seconds, and then relax for 3 seconds. ? Start with 3 seconds. Then add 1 second each week until you are able to squeeze for 10 seconds.   ? Repeat the exercise 10 to 15 times for each session. Do three or more sessions each day. Ease or reduce swelling in your feet, ankles, hands, and fingers  · If your fingers are puffy, take off your rings. · Do not eat high-salt foods, such as potato chips. · Prop up your feet on a stool or couch as much as possible. Sleep with pillows under your feet. · Do not stand for long periods of time or wear tight shoes. · Wear support stockings. Where can you learn more? Go to http://www.moya.com/  Enter G264 in the search box to learn more about \"Weeks 22 to 26 of Your Pregnancy: Care Instructions. \"  Current as of: 2020               Content Version: 12.6  © 0346-8798 [employer]. Care instructions adapted under license by [employer] (which disclaims liability or warranty for this information). If you have questions about a medical condition or this instruction, always ask your healthcare professional. [place] [address] any warranty or liability for your use of this information. Urinary Tract Infection in Pregnancy: Care Instructions  Your Care Instructions     A urinary tract infection, or UTI, is an infection of the bladder and other urinary structures. Most UTIs occur in the bladder. They often cause pain or burning when you urinate. UTI is the most common bacterial infection in pregnancy. If untreated, a UTI could lead to problems such as a kidney infection or  labor. Most UTIs can be cured with antibiotics. Your doctor will prescribe an antibiotic that is safe during pregnancy. Be sure to finish your medicine so that the infection does not spread to your kidneys. Follow-up care is a key part of your treatment and safety. Be sure to make and go to all appointments, and call your doctor if you are having problems. It's also a good idea to know your test results and keep a list of the medicines you take.   How can you care for yourself at home? · Take your antibiotics as directed. Do not stop taking them just because you feel better. You need to take the full course of antibiotics. · Drink extra water and other fluids for the next day or two. This will help wash out the bacteria causing the infection. If you have kidney, heart, or liver disease and have to limit fluids, talk with your doctor before you increase the amount of fluids you drink. · Do not drink alcohol. · Urinate often. Try to empty your bladder each time. Preventing UTIs  · Drink plenty of fluids, enough so that your urine is light yellow or clear like water. This helps you urinate often, which clears bacteria from your system. If you have kidney, heart, or liver disease and have to limit fluids, talk with your doctor before you increase the amount of fluids you drink. · Urinate when you first have the urge. · Urinate right after you have sex. This is the best way for women to avoid UTIs. · When going to the bathroom, wipe from front to back to keep bacteria from entering the vagina or urethra. When should you call for help? Call your doctor now or seek immediate medical care if:    · You have symptoms of a worse urinary tract infection. These may include:  ? Pain or burning when you urinate. ? A frequent need to urinate without being able to pass much urine. ? Pain in the flank, which is just below the rib cage and above the waist on either side of the back. ? Blood in your urine. ? A fever. Watch closely for changes in your health, and be sure to contact your doctor if:    · You do not get better as expected. Where can you learn more? Go to http://www.gray.com/  Enter M982 in the search box to learn more about \"Urinary Tract Infection in Pregnancy: Care Instructions. \"  Current as of: February 11, 2020               Content Version: 12.6  © 1967-9527 [employer].    Care instructions adapted under license by [employer] (which disclaims liability or warranty for this information). If you have questions about a medical condition or this instruction, always ask your healthcare professional. [address] any warranty or liability for your use of this information. Learning About Pregnancy  Your Care Instructions     Your health in the early weeks of your pregnancy is particularly important for your baby's health. Take good care of yourself. Anything you do that harms your body can also harm your baby. Make sure to go to all of your doctor appointments. Regular checkups will help keep you and your baby healthy. How can you care for yourself at home? Diet    · Eat a balanced diet. Make sure your diet includes plenty of beans, peas, and leafy green vegetables.     · Do not skip meals or go for many hours without eating. If you are nauseated, try to eat a small, healthy snack every 2 to 3 hours.     · Do not eat fish that has a high level of mercury, such as shark, swordfish, or mackerel. Do not eat more than one can of tuna each week.     · Drink plenty of fluids, enough so that your urine is light yellow or clear like water. If you have kidney, heart, or liver disease and have to limit fluids, talk with your doctor before you increase the amount of fluids you drink.     · Cut down on caffeine, such as coffee, tea, and cola.     · Do not drink alcohol, such as beer, wine, or hard liquor.     · Take a multivitamin that contains at least 400 micrograms (mcg) of folic acid to help prevent birth defects. Fortified cereal and whole wheat bread are good additional sources of folic acid.     · Increase the calcium in your diet. Try to drink a quart of skim milk each day. You may also take calcium supplements and choose foods such as cheese and yogurt. Lifestyle    · Make sure you go to your follow-up appointments.     · Get plenty of rest. You may be unusually tired while you are pregnant.   · Get at least 30 minutes of exercise on most days of the week. Walking is a good choice. If you have not exercised in the past, start out slowly. Take several short walks each day.     · Do not smoke. If you need help quitting, talk to your doctor about stop-smoking programs. These can increase your chances of quitting for good.     · Do not touch cat feces or litter boxes. Also, wash your hands after you handle raw meat, and fully cook all meat before you eat it. Wear gloves when you work in the yard or garden, and wash your hands well when you are done. Cat feces, raw or undercooked meat, and contaminated dirt can cause an infection that may harm your baby or lead to a miscarriage.     · Do not use saunas or hot tubs. Raising your body temperature may harm your baby.     · Avoid chemical fumes, paint fumes, or poisons.     · Do not use illegal drugs or alcohol. Medicines    · Review all of your medicines with your doctor. Some of your routine medicines may need to be changed to protect your baby.     · Use acetaminophen (Tylenol) to relieve minor problems, such as a mild headache or backache or a mild fever with cold symptoms. Do not use nonsteroidal anti-inflammatory drugs (NSAIDs), such as ibuprofen (Advil, Motrin) or naproxen (Aleve), unless your doctor says it is okay.     · Do not take two or more pain medicines at the same time unless the doctor told you to. Many pain medicines have acetaminophen, which is Tylenol. Too much acetaminophen (Tylenol) can be harmful.     · Take your medicines exactly as prescribed. Call your doctor if you think you are having a problem with your medicine. To manage morning sickness    · If you feel sick when you first wake up, try eating a small snack (such as crackers) before you get out of bed. Allow some time to digest the snack, and then get out of bed slowly.     · Do not skip meals or go for long periods without eating.  An empty stomach can make nausea worse.     · Eat small, frequent meals instead of three large meals each day.     · Drink plenty of fluids. Sports drinks, such as Gatorade or Powerade, are good choices.     · Eat foods that are high in protein but low in fat.     · If you are taking iron supplements, ask your doctor if they are necessary. Iron can make nausea worse.     · Avoid any smells, such as coffee, that make you feel sick.     · Get lots of rest. Morning sickness may be worse when you are tired. Follow-up care is a key part of your treatment and safety. Be sure to make and go to all appointments, and call your doctor if you are having problems. It's also a good idea to know your test results and keep a list of the medicines you take. Where can you learn more? Go to http://www.gray.com/  Enter H694 in the search box to learn more about \"Learning About Pregnancy. \"  Current as of: February 11, 2020               Content Version: 12.6  © 2006-2020 [employer]. Care instructions adapted under license by [employer] (which disclaims liability or warranty for this information). If you have questions about a medical condition or this instruction, always ask your healthcare professional. [address] any warranty or liability for your use of this information. Belly Pain in Pregnancy: Care Instructions  Your Care Instructions     When you're pregnant, any belly pain can be a worry. You may not want to call your doctor about every pain you have. But you don't want to miss something that is dangerous for you or your baby. Even if it feels familiar, belly pain can mean something new when you're pregnant. It's important to know when to call your doctor. It will also help to know how to care for yourself at home when your pain is not caused by anything harmful. · When belly pain is more severe or constant, see a doctor right away.   · If you're sure your belly pain is a sign of labor, call your doctor. · When belly pain is brief, it's usually a normal part of pregnancy. It might be related to changes in the growing uterus. Or it could be the stretching of ligaments called round ligaments. These ligaments help support the uterus. Round ligament pain can be on either side of your belly. It can also be felt in your hips or groin. Follow-up care is a key part of your treatment and safety. Be sure to make and go to all appointments, and call your doctor if you are having problems. It's also a good idea to know your test results and keep a list of the medicines you take. How can you tell if belly pain is a sign of labor? When belly pain is caused by labor, it can feel like mild or menstrual-like cramps in your lower belly. These cramps are probably contractions. They can happen in your second or third trimester. You may also have:  · A steady, dull ache in your lower back, pelvis, or thighs. · A feeling of pressure in your pelvis or lower belly. · Changes in your vaginal discharge or a sudden release of fluid from the vagina. If you think you are in labor, call your doctor. How can you care for yourself at home? When belly pain is mild and is not a symptom of labor:  · Rest until you feel better. · Take a warm bath. · Think about what you drink and eat:  ? Drink plenty of fluids. Choose water and other caffeine-free clear liquids until you feel better. ? Try eating small, frequent meals. If your stomach is upset, try bland, low-fat foods like plain rice, broiled chicken, toast, and yogurt. · Think about how you move if you are having brief pains from stretching of the round ligaments. ? Try gentle stretching. ? Move a little more slowly when turning in bed or getting up from a chair, so those ligaments don't stretch quickly. ? Lean forward a bit if you think you are going to cough or sneeze. When should you call for help?    Call 911 anytime you think you may need emergency care. For example, call if:    · You have sudden, severe pain in your belly.     · You have severe vaginal bleeding.     · You passed out (lost consciousness).     · You have a seizure. Call your doctor now or seek immediate medical care if:    · You have new or worse belly pain or cramping.     · You have any vaginal bleeding.     · You have a fever.     · You have symptoms of preeclampsia, such as:  ? Sudden swelling of your face, hands, or feet. ? New vision problems (such as dimness, blurring, or seeing spots). ? A severe headache.     · You think that you may be in labor. This means that you've had at least 8 contractions within 1 hour or at least 4 contractions within 20 minutes, even after you change your position and drink fluids.     · You have symptoms of a urinary tract infection. These may include:  ? Pain or burning when you urinate. ? A frequent need to urinate without being able to pass much urine. ? Pain in the flank, which is just below the rib cage and above the waist on either side of the back. ? Blood in your urine. Watch closely for changes in your health, and be sure to contact your doctor if you are worried about your or your baby's health. Where can you learn more? Go to http://www.[employer].com/  Enter B275 in the search box to learn more about \"Belly Pain in Pregnancy: Care Instructions. \"  Current as of: February 11, 2020               Content Version: 12.6  © 6587-0665 [employer]. Care instructions adapted under license by [employer] (which disclaims liability or warranty for this information). If you have questions about a medical condition or this instruction, always ask your healthcare professional. [place] [address] any warranty or liability for your use of this information.

## 2020-11-07 NOTE — PROGRESS NOTES
Reviewed UA sample wnl. Possibly not showing bacteriuria s/p rocephin yesterday. Will send urine cx as well. Rx given for macrobid. Encouraged to see Dr. Sujey Daigle on Monday if not feeling s/s improving. Reg scheduled visit in 2 wks. DS reviewed. PTL precautions. DC home.
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
## 2020-11-07 NOTE — ED TRIAGE NOTES
2120: Pt arrived via wheelchair with c/o pelvic pain and vaginal pressure starting yesterday. Pt stated she went to [facility] yesterday and received a dose of Rocephin and stated she was supposed to receive another prescription for an antibiotic but never received it. Pt denies any leaking of amniotic fluid or vaginal bleeding and any current painful ctx.   2121: R. Dellia Klinefelter notified. 2125: R. Dellia Klinefelter at bedside. SVE closed and posterior. 2320: R. Dellia Klinefelter at bedside. Plan to discharge pt with antibiotic.    2325: I have reviewed discharge instructions with the patient. The patient verbalized understanding. Pt ambulatory off of unit.

## 2020-11-08 LAB
BACTERIA SPEC CULT: NORMAL
CC UR VC: NORMAL
SERVICE CMNT-IMP: NORMAL

## 2020-11-08 NOTE — PROGRESS NOTES
Reviewed Urine Cx results from 11/6 with patient via telephone call. Normal results reported and instructed to stop taking macrobid at this time. Pt reports feeling better and has f/u with you 11/19/20.

## 2020-11-19 ENCOUNTER — HOSPITAL ENCOUNTER (EMERGENCY)
Age: 25
Discharge: HOME | End: 2020-11-19
Attending: EMERGENCY MEDICINE
Payer: COMMERCIAL

## 2020-11-19 VITALS
OXYGEN SATURATION: 100 % | DIASTOLIC BLOOD PRESSURE: 78 MMHG | SYSTOLIC BLOOD PRESSURE: 124 MMHG | HEART RATE: 82 BPM | RESPIRATION RATE: 16 BRPM | TEMPERATURE: 98.7 F

## 2020-11-19 DIAGNOSIS — Z78.9 NORMAL URINALYSIS: ICD-10-CM

## 2020-11-19 DIAGNOSIS — R50.9 FEVER, UNSPECIFIED FEVER CAUSE: Primary | ICD-10-CM

## 2020-11-19 LAB
APPEARANCE UR: CLEAR
BACTERIA URNS QL MICRO: NEGATIVE /HPF
BILIRUB UR QL: NEGATIVE
COLOR UR: NORMAL
EPITH CASTS URNS QL MICRO: NORMAL /LPF
GLUCOSE UR STRIP.AUTO-MCNC: NEGATIVE MG/DL
HGB UR QL STRIP: NEGATIVE
KETONES UR QL STRIP.AUTO: NEGATIVE MG/DL
LEUKOCYTE ESTERASE UR QL STRIP.AUTO: NEGATIVE
NITRITE UR QL STRIP.AUTO: NEGATIVE
PH UR STRIP: 7 [PH] (ref 5–8)
PROT UR STRIP-MCNC: NEGATIVE MG/DL
RBC #/AREA URNS HPF: NORMAL /HPF (ref 0–5)
SP GR UR REFRACTOMETRY: <1.005 (ref 1–1.03)
UR CULT HOLD, URHOLD: NORMAL
UROBILINOGEN UR QL STRIP.AUTO: 0.2 EU/DL (ref 0.2–1)
WBC URNS QL MICRO: NORMAL /HPF (ref 0–4)

## 2020-11-19 PROCEDURE — 99284 EMERGENCY DEPT VISIT MOD MDM: CPT

## 2020-11-19 PROCEDURE — 74011250637 HC RX REV CODE- 250/637: Performed by: NURSE PRACTITIONER

## 2020-11-19 PROCEDURE — 81001 URINALYSIS AUTO W/SCOPE: CPT

## 2020-11-19 RX ORDER — ACETAMINOPHEN 325 MG/1
650 TABLET ORAL
Status: DISCONTINUED | OUTPATIENT
Start: 2020-11-19 | End: 2020-11-19
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
## 2020-11-19 NOTE — DISCHARGE INSTRUCTIONS
Your physician or healthcare provider has determined that you are at risk for the Coronavirus (COVID-19). If you have met CDC criteria, your physician may have sent a laboratory test.  Please adhere to the following restrictions until you obtain your results or are cleared by your primary care doctor:    Stay at home except to get medical care. Seek medical attention if you develop worsening symptoms or new concerns such as severe shortness of breath, chest pain, etc.    Separate yourself from other people and animals in your home. If possible, stay in a separate room and use a separate bathroom from others in your house. Restrict contact with pets, as there is a possibility of transmission of the virus. Call your doctor before showing up at their office. Let them know you have or may have COVID-19    Wear a facemask when you are around other people. Cover your mouth when you cough or sneeze. Wash your hands often with warm soapy water for at least 20 seconds. If soap and water are not available, use an alcohol based hand . Clean all high touch surfaces everyday. For example: counters, tabletops, doorknobs, bathroom fixtures, toilets, phones, keyboards, tablets, and bedside tables. Monitor your symptoms at home. Seek prompt medical attention if you symptoms worsen. (i.e. difficulty breathing). Call your healthcare provider before coming and tell them you may have COVID-19. Wear a mask upon entering the facility. Stay at home until all these things have happened:   You have had no fevers for at least 24 hours (without fever reducing meds)  AND  Your other symptoms have improved  (e.g. cough, shortness of breath) AND  At least 10 days have passed since the beginning of your symptoms      Source: CDC website ([employer])      If you have further questions about the Coronavirus (COVID-19), please contact the [place] Department of Health COVID-19 Hotline at 6-548.996.4195, the [facility] [address] at [phone] or [facility] [phone]. If you change your mind and want to be checked for COVID-19, you may do an outpatient swab. [facility].  Return to the ED with worsening of symptoms.

## 2020-11-19 NOTE — ED TRIAGE NOTES
Patient was at the dentist at 1pm and they told her she had a fever of 100.2. She is not having any symptoms.  Temp in triage 99.1

## 2020-12-19 ENCOUNTER — HOSPITAL ENCOUNTER (OUTPATIENT)
Age: 25
Setting detail: OBSERVATION
Discharge: HOME | End: 2020-12-20
Attending: OBSTETRICS & GYNECOLOGY | Admitting: OBSTETRICS & GYNECOLOGY
Payer: COMMERCIAL

## 2020-12-19 ENCOUNTER — APPOINTMENT (OUTPATIENT)
Dept: CT IMAGING | Age: 25
End: 2020-12-19
Attending: MIDWIFE
Payer: COMMERCIAL

## 2020-12-19 ENCOUNTER — HOSPITAL ENCOUNTER (EMERGENCY)
Age: 25
Discharge: STILL ADMITTED OR ADMINISTRATIVE | End: 2020-12-19
Attending: EMERGENCY MEDICINE

## 2020-12-19 DIAGNOSIS — Z3A.28 28 WEEKS GESTATION OF PREGNANCY: Primary | ICD-10-CM

## 2020-12-19 DIAGNOSIS — R51.9 ACUTE INTRACTABLE HEADACHE, UNSPECIFIED HEADACHE TYPE: ICD-10-CM

## 2020-12-19 DIAGNOSIS — H43.392 FLOATERS IN VISUAL FIELD, LEFT: ICD-10-CM

## 2020-12-19 LAB
ALBUMIN SERPL-MCNC: 2.8 G/DL (ref 3.5–5)
ALBUMIN/GLOB SERPL: 0.8 {RATIO} (ref 1.1–2.2)
ALP SERPL-CCNC: 66 U/L (ref 45–117)
ALT SERPL-CCNC: 17 U/L (ref 12–78)
ANION GAP SERPL CALC-SCNC: 5 MMOL/L (ref 5–15)
AST SERPL-CCNC: 8 U/L (ref 15–37)
BILIRUB SERPL-MCNC: 0.1 MG/DL (ref 0.2–1)
BUN SERPL-MCNC: 8 MG/DL (ref 6–20)
BUN/CREAT SERPL: 12 (ref 12–20)
CALCIUM SERPL-MCNC: 8.8 MG/DL (ref 8.5–10.1)
CHLORIDE SERPL-SCNC: 106 MMOL/L (ref 97–108)
CO2 SERPL-SCNC: 26 MMOL/L (ref 21–32)
CREAT SERPL-MCNC: 0.68 MG/DL (ref 0.55–1.02)
ERYTHROCYTE [DISTWIDTH] IN BLOOD BY AUTOMATED COUNT: 12.6 % (ref 11.5–14.5)
GLOBULIN SER CALC-MCNC: 3.4 G/DL (ref 2–4)
GLUCOSE SERPL-MCNC: 88 MG/DL (ref 65–100)
HCT VFR BLD AUTO: 33.7 % (ref 35–47)
HGB BLD-MCNC: 11.2 G/DL (ref 11.5–16)
MAGNESIUM SERPL-MCNC: 1.8 MG/DL (ref 1.6–2.4)
MCH RBC QN AUTO: 30.8 PG (ref 26–34)
MCHC RBC AUTO-ENTMCNC: 33.2 G/DL (ref 30–36.5)
MCV RBC AUTO: 92.6 FL (ref 80–99)
NRBC # BLD: 0 K/UL (ref 0–0.01)
NRBC BLD-RTO: 0 PER 100 WBC
PLATELET # BLD AUTO: 214 K/UL (ref 150–400)
PMV BLD AUTO: 10.3 FL (ref 8.9–12.9)
POTASSIUM SERPL-SCNC: 3.8 MMOL/L (ref 3.5–5.1)
PROT SERPL-MCNC: 6.2 G/DL (ref 6.4–8.2)
RBC # BLD AUTO: 3.64 M/UL (ref 3.8–5.2)
SODIUM SERPL-SCNC: 137 MMOL/L (ref 136–145)
WBC # BLD AUTO: 8.3 K/UL (ref 3.6–11)

## 2020-12-19 PROCEDURE — 99285 EMERGENCY DEPT VISIT HI MDM: CPT

## 2020-12-19 PROCEDURE — 36415 COLL VENOUS BLD VENIPUNCTURE: CPT

## 2020-12-19 PROCEDURE — 80053 COMPREHEN METABOLIC PANEL: CPT

## 2020-12-19 PROCEDURE — 96365 THER/PROPH/DIAG IV INF INIT: CPT

## 2020-12-19 PROCEDURE — 74011250637 HC RX REV CODE- 250/637: Performed by: MIDWIFE

## 2020-12-19 PROCEDURE — 65270000029 HC RM PRIVATE

## 2020-12-19 PROCEDURE — 99218 HC RM OBSERVATION: CPT

## 2020-12-19 PROCEDURE — 96374 THER/PROPH/DIAG INJ IV PUSH: CPT

## 2020-12-19 PROCEDURE — 74011250636 HC RX REV CODE- 250/636: Performed by: OBSTETRICS & GYNECOLOGY

## 2020-12-19 PROCEDURE — 83735 ASSAY OF MAGNESIUM: CPT

## 2020-12-19 PROCEDURE — 96360 HYDRATION IV INFUSION INIT: CPT

## 2020-12-19 PROCEDURE — 85027 COMPLETE CBC AUTOMATED: CPT

## 2020-12-19 PROCEDURE — 74011250636 HC RX REV CODE- 250/636: Performed by: MIDWIFE

## 2020-12-19 PROCEDURE — 74011000258 HC RX REV CODE- 258: Performed by: OBSTETRICS & GYNECOLOGY

## 2020-12-19 PROCEDURE — 70450 CT HEAD/BRAIN W/O DYE: CPT

## 2020-12-19 RX ORDER — SODIUM CHLORIDE 0.9 % (FLUSH) 0.9 %
5-40 SYRINGE (ML) INJECTION AS NEEDED
Status: DISCONTINUED | OUTPATIENT
Start: 2020-12-19 | End: 2020-12-20 | Stop reason: HOSPADM

## 2020-12-19 RX ORDER — DIPHENHYDRAMINE HCL 25 MG
25 CAPSULE ORAL
Status: DISCONTINUED | OUTPATIENT
Start: 2020-12-19 | End: 2020-12-20 | Stop reason: HOSPADM

## 2020-12-19 RX ORDER — ACETAMINOPHEN 500 MG
1000 TABLET ORAL ONCE
Status: COMPLETED | OUTPATIENT
Start: 2020-12-19 | End: 2020-12-19

## 2020-12-19 RX ORDER — METOCLOPRAMIDE 10 MG/1
10 TABLET ORAL ONCE
Status: COMPLETED | OUTPATIENT
Start: 2020-12-20 | End: 2020-12-20

## 2020-12-19 RX ORDER — OMEGA-3-ACID ETHYL ESTERS 1 G/1
2 CAPSULE, LIQUID FILLED ORAL 2 TIMES DAILY
COMMUNITY

## 2020-12-19 RX ORDER — LANOLIN ALCOHOL/MO/W.PET/CERES
400 CREAM (GRAM) TOPICAL DAILY
Status: DISCONTINUED | OUTPATIENT
Start: 2020-12-20 | End: 2020-12-20 | Stop reason: HOSPADM

## 2020-12-19 RX ORDER — BUTALBITAL, ACETAMINOPHEN AND CAFFEINE 50; 325; 40 MG/1; MG/1; MG/1
1 TABLET ORAL
Status: DISCONTINUED | OUTPATIENT
Start: 2020-12-19 | End: 2020-12-20

## 2020-12-19 RX ORDER — METOCLOPRAMIDE 10 MG/1
10 TABLET ORAL EVERY 6 HOURS
Status: DISCONTINUED | OUTPATIENT
Start: 2020-12-20 | End: 2020-12-19

## 2020-12-19 RX ORDER — SODIUM CHLORIDE 0.9 % (FLUSH) 0.9 %
5-40 SYRINGE (ML) INJECTION EVERY 8 HOURS
Status: DISCONTINUED | OUTPATIENT
Start: 2020-12-20 | End: 2020-12-20 | Stop reason: HOSPADM

## 2020-12-19 RX ORDER — DEXTROSE, SODIUM CHLORIDE, SODIUM LACTATE, POTASSIUM CHLORIDE, AND CALCIUM CHLORIDE 5; .6; .31; .03; .02 G/100ML; G/100ML; G/100ML; G/100ML; G/100ML
1000 INJECTION, SOLUTION INTRAVENOUS ONCE
Status: COMPLETED | OUTPATIENT
Start: 2020-12-19 | End: 2020-12-19

## 2020-12-19 RX ADMIN — PROMETHAZINE HYDROCHLORIDE 12.5 MG: 25 INJECTION INTRAMUSCULAR; INTRAVENOUS at 21:25

## 2020-12-19 RX ADMIN — SODIUM CHLORIDE, SODIUM LACTATE, POTASSIUM CHLORIDE, CALCIUM CHLORIDE, AND DEXTROSE MONOHYDRATE 1000 ML: 600; 310; 30; 20; 5 INJECTION, SOLUTION INTRAVENOUS at 21:25

## 2020-12-19 RX ADMIN — ACETAMINOPHEN 1000 MG: 500 TABLET ORAL at 21:25

## 2020-12-20 VITALS
TEMPERATURE: 98.7 F | RESPIRATION RATE: 16 BRPM | DIASTOLIC BLOOD PRESSURE: 73 MMHG | SYSTOLIC BLOOD PRESSURE: 112 MMHG | OXYGEN SATURATION: 100 % | HEART RATE: 82 BPM

## 2020-12-20 PROBLEM — H43.392 FLOATERS IN VISUAL FIELD, LEFT: Status: ACTIVE | Noted: 2020-12-20

## 2020-12-20 PROBLEM — R51.9 ACUTE HEADACHE: Status: ACTIVE | Noted: 2020-12-20

## 2020-12-20 PROCEDURE — 59025 FETAL NON-STRESS TEST: CPT

## 2020-12-20 PROCEDURE — 99218 HC RM OBSERVATION: CPT

## 2020-12-20 PROCEDURE — 74011250637 HC RX REV CODE- 250/637: Performed by: MIDWIFE

## 2020-12-20 PROCEDURE — 74011250637 HC RX REV CODE- 250/637: Performed by: OBSTETRICS & GYNECOLOGY

## 2020-12-20 RX ORDER — BUTALBITAL, ACETAMINOPHEN AND CAFFEINE 50; 325; 40 MG/1; MG/1; MG/1
2 TABLET ORAL
Status: DISCONTINUED | OUTPATIENT
Start: 2020-12-20 | End: 2020-12-20 | Stop reason: HOSPADM

## 2020-12-20 RX ADMIN — BUTALBITAL, ACETAMINOPHEN, AND CAFFEINE 1 TABLET: 50; 325; 40 TABLET ORAL at 00:36

## 2020-12-20 RX ADMIN — BUTALBITAL, ACETAMINOPHEN, AND CAFFEINE 2 TABLET: 50; 325; 40 TABLET ORAL at 12:53

## 2020-12-20 RX ADMIN — MAGNESIUM OXIDE TAB 400 MG (241.3 MG ELEMENTAL MG) 400 MG: 400 (241.3 MG) TAB at 01:14

## 2020-12-20 RX ADMIN — METOCLOPRAMIDE 10 MG: 10 TABLET ORAL at 00:37
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
## 2020-12-20 NOTE — PROGRESS NOTES
Bedside and Verbal shift change report given to Kassi Gu RN  (oncoming nurse) by Hosey Homans  (offgoing nurse). Report included the following information SBAR, Kardex, ED Summary, Intake/Output, MAR and Recent Results. 1445- I have reviewed discharge instructions with the patient and parent. The patient and parent verbalized understanding. Patient given copy of discharge instructions. Denies any further questions at this time. IV removed.

## 2020-12-20 NOTE — ED TRIAGE NOTES
0030-  Called number on pt insurance card to determine in-network provider for  [facility]. Spoke with DC, who confirmed [facility] is an in network provider for hospital admission. Confirmation number [id number].

## 2020-12-20 NOTE — PROGRESS NOTES
High Risk Obstetrics Progress Note    Name: Jeremy Mansfield MRN: [id number]  SSN: [id number]    YOB: 1995  Age: 26 y.o. Sex: female      Subjective:      LOS: 1 day    Estimated Date of Delivery: 3/10/21   Gestational Age Today: 33w3d     Patient admitted for headache. States she does have mild headache  and does not have abdominal pain  , chest pain, contractions, fever, nausea and vomiting, pelvic pressure, right upper quadrant pain  , shortness of breath, swelling, vaginal bleeding , vaginal leaking of fluid  and visual disturbances. Objective:     Vitals:  Blood pressure (!) 92/53, pulse 84, temperature 97.8 °F (36.6 °C), resp. rate 16, last menstrual period 2020, SpO2 100 %. Temp (24hrs), Av.3 °F (36.8 °C), Min:97.8 °F (36.6 °C), Max:98.7 °F (64.5 °C)    Systolic (99FDY), OJU:867 , Min:92 , KCQ:024      Diastolic (86SNI), AKU:63, Min:53, Max:73       Intake and Output:         Physical Exam:  Patient without distress.   Heart: Regular rate and rhythm  Lung: clear to auscultation throughout lung fields, no wheezes, no rales, no rhonchi and normal respiratory effort  Abdomen: soft, nontender  Fundus: soft and non tender  Lower Extremities:  - Edema No   - Patellar Reflexes: 1+ bilaterally   - Clonus: absent       Membranes:  Intact    Uterine Activity:  None    Fetal Heart Rate:  Reactive        Labs:   Recent Results (from the past 36 hour(s))   CBC W/O DIFF    Collection Time: 20  8:43 PM   Result Value Ref Range    WBC 8.3 3.6 - 11.0 K/uL    RBC 3.64 (L) 3.80 - 5.20 M/uL    HGB 11.2 (L) 11.5 - 16.0 g/dL    HCT 33.7 (L) 35.0 - 47.0 %    MCV 92.6 80.0 - 99.0 FL    MCH 30.8 26.0 - 34.0 PG    MCHC 33.2 30.0 - 36.5 g/dL    RDW 12.6 11.5 - 14.5 %    PLATELET 160 983 - 373 K/uL    MPV 10.3 8.9 - 12.9 FL    NRBC 0.0 0  WBC    ABSOLUTE NRBC 0.00 0.00 - 0.87 K/uL   METABOLIC PANEL, COMPREHENSIVE    Collection Time: 20  8:43 PM   Result Value Ref Range    Sodium 137 136 - 145 mmol/L    Potassium 3.8 3.5 - 5.1 mmol/L    Chloride 106 97 - 108 mmol/L    CO2 26 21 - 32 mmol/L    Anion gap 5 5 - 15 mmol/L    Glucose 88 65 - 100 mg/dL    BUN 8 6 - 20 MG/DL    Creatinine 0.68 0.55 - 1.02 MG/DL    BUN/Creatinine ratio 12 12 - 20      GFR est AA >60 >60 ml/min/1.73m2    GFR est non-AA >60 >60 ml/min/1.73m2    Calcium 8.8 8.5 - 10.1 MG/DL    Bilirubin, total 0.1 (L) 0.2 - 1.0 MG/DL    ALT (SGPT) 17 12 - 78 U/L    AST (SGOT) 8 (L) 15 - 37 U/L    Alk. phosphatase 66 45 - 117 U/L    Protein, total 6.2 (L) 6.4 - 8.2 g/dL    Albumin 2.8 (L) 3.5 - 5.0 g/dL    Globulin 3.4 2.0 - 4.0 g/dL    A-G Ratio 0.8 (L) 1.1 - 2.2     MAGNESIUM    Collection Time: 12/19/20  8:43 PM   Result Value Ref Range    Magnesium 1.8 1.6 - 2.4 mg/dL       Assessment and Plan: Active Problems:    28 weeks gestation of pregnancy (12/19/2020)      Acute headache (12/20/2020)      Floaters in visual field, left (12/20/2020)       Headache   PET workup negative, improved wth fioricet. Possible early migriane. Will retreat with fioricet and possible phenergan, consider d/c with f/u in the office this week.  Discussed pet signs and symptosm and possible need to for weekly visits going forward
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
## 2020-12-20 NOTE — PROGRESS NOTES
Juan Rendon TRANSFER - IN REPORT:    Verbal report received from [address], RN(name) on Pamela Price  being received from [facility](unit) for routine progression of care      Report consisted of patients Situation, Background, Assessment and   Recommendations(SBAR). Information from the following report(s) SBAR, Kardex, Procedure Summary, Intake/Output, MAR, Accordion and Recent Results was reviewed with the receiving nurse. Opportunity for questions and clarification was provided. Assessment completed upon patients arrival to unit and care assumed.

## 2020-12-20 NOTE — PROGRESS NOTES
Rvw'd wnl labs and neg CT with pt. However, her headache continues and is unchanged. Discussed with Dr Leticia Gerard, will admit to [facility] and try reglan + magnesium now, can try fiorocet instead of next dose of tylenol as well. Admission orders placed.

## 2020-12-20 NOTE — DISCHARGE INSTRUCTIONS
Patient Education     Patient Education        Weeks 26 to 30 of Your Pregnancy: Care Instructions  Your Care Instructions     You are now in your last trimester of pregnancy. Your baby is growing rapidly. And you'll probably feel your baby moving around more often. Your doctor may ask you to count your baby's kicks. Your back may ache as your body gets used to your baby's size and length. If you haven't already had the Tdap shot during this pregnancy, talk to your doctor about getting it. It will help protect your  against pertussis infection. During this time, it's important to take care of yourself and pay attention to what your body needs. If you feel sexual, explore ways to be close with your partner that match your comfort and desire. Use the tips provided in this care sheet to find ways to be sexual in your own way. Follow-up care is a key part of your treatment and safety. Be sure to make and go to all appointments, and call your doctor if you are having problems. It's also a good idea to know your test results and keep a list of the medicines you take. How can you care for yourself at home? Take it easy at work  · Take frequent breaks. If possible, stop working when you are tired, and rest during your lunch hour. · Take bathroom breaks every 2 hours. · Change positions often. If you sit for long periods, stand up and walk around. · When you stand for a long time, keep one foot on a low stool with your knee bent. After standing a lot, sit with your feet up. · Avoid fumes, chemicals, and tobacco smoke. Be sexual in your own way  · Having sex during pregnancy is okay, unless your doctor tells you not to. · You may be very interested in sex, or you may have no interest at all. · Your growing belly can make it hard to find a good position during intercourse. [place] and explore. · You may get cramps in your uterus when your partner touches your breasts.   · A back rub may relieve the backache or cramps that sometimes follow orgasm. Learn about  labor  · Watch for signs of  labor. You may be going into labor if:  ? You have menstrual-like cramps, with or without nausea. ? You have about 6 or more contractions in 1 hour, even after you have had a glass of water and are resting. ? You have a low, dull backache that does not go away when you change your position. ? You have pain or pressure in your pelvis that comes and goes in a pattern. ? You have intestinal cramping or flu-like symptoms, with or without diarrhea.  ? You notice an increase or change in your vaginal discharge. Discharge may be heavy, mucus-like, watery, or streaked with blood. ? Your water breaks. · If you think you have  labor:  ? Drink 2 or 3 glasses of water or juice. Not drinking enough fluids can cause contractions. ? Stop what you are doing, and empty your bladder. Then lie down on your left side for at least 1 hour. ? While lying on your side, find your breast bone. Put your fingers in the soft spot just below it. Move your fingers down toward your belly button to find the top of your uterus. Check to see if it is tight. ? Contractions can be weak or strong. Record your contractions for an hour. Time a contraction from the start of one contraction to the start of the next one.  ? Single or several strong contractions without a pattern are called Calvert-Brown contractions. They are practice contractions but not the start of labor. They often stop if you change what you are doing. ? Call your doctor if you have regular contractions. Where can you learn more? Go to http://www.gray.com/  Enter B296 in the search box to learn more about \"Weeks 26 to 30 of Your Pregnancy: Care Instructions. \"  Current as of: 2020               Content Version: 12.6  © 8425-7315 [employer].    Care instructions adapted under license by [employer] (which disclaims liability or warranty for this information). If you have questions about a medical condition or this instruction, always ask your healthcare professional. [address] any warranty or liability for your use of this information. Patient Education        Preeclampsia: Care Instructions  Overview     Preeclampsia occurs when a woman's blood pressure rises during pregnancy. Often with preeclampsia, you also have swelling in your legs, hands, and face. A test may show too much protein in your urine. Preeclampsia is also called toxemia. If preeclampsia is severe and not treated, it can lead to seizures (eclampsia) and damage to your liver or kidneys. Preeclampsia can prevent your baby from getting enough food and oxygen. This can cause a low birth weight or other problems. Your doctor will watch you closely to prevent these problems. He or she also may recommend that you reduce your activity. If your preeclampsia is a danger to your health or the health of your baby, your doctor may need to deliver your baby early. While preeclampsia is a concern, most women with preeclampsia have healthy babies. After a woman gives birth, preeclampsia usually goes away on its own. But symptoms may last a few weeks or more and can get worse after delivery. Rarely, symptoms of preeclampsia don't show up until days or even weeks after childbirth. Follow-up care is a key part of your treatment and safety. Be sure to make and go to all appointments, and call your doctor if you are having problems. It's also a good idea to know your test results and keep a list of the medicines you take. How can you care for yourself at home? · Take and record your blood pressure at home if your doctor tells you to. ? Learn the importance of the two measures of blood pressure (such as 120 over 80, or 120/80). The first number is the systolic pressure, which is the force of blood on the artery walls as the heart pumps. The second number is the diastolic pressure, which is the force of blood on the artery walls between heartbeats, when the heart is at rest. You have a choice of monitors to use. ? Manual monitor: You pump up the cuff and use a stethoscope to listen for your pulse. ? Electronic monitor: The cuff inflates, and a gauge shows your pulse rate. ? To take your blood pressure:  ? Ask your doctor to check your blood pressure monitor to be sure that it is accurate and that the cuff fits you. Also ask your doctor to watch you to make sure that you are using it right. ? You should not eat, use tobacco products, or use medicine known to raise blood pressure (such as some nasal decongestant sprays) before you take your blood pressure. ? Avoid taking your blood pressure if you have just exercised. Also avoid taking it if you are nervous or upset. Rest at least 15 minutes before you take your blood pressure. · If your doctor advises, check the protein levels in your urine. Your doctor or nurse will show you how to do this. · Take your medicines exactly as prescribed. Call your doctor if you think you are having a problem with your medicine. · Do not smoke. Quitting smoking will help improve your baby's growth and health. If you need help quitting, talk to your doctor about stop-smoking programs and medicines. These can increase your chances of quitting for good. · Eat a balanced and healthy diet that has lots of fruits and vegetables. · If your doctor advised bed rest, be sure to stay off your feet and rest as much as possible. ? Keep a phone, notepad, and pen near the bed where you can easily reach them. ? Gently stretch your legs every hour to maintain good blood flow. ? Have another family member pack snacks and lunch food in a cooler close to your bed. ? Use this time for activities that you usually cannot find time for, such as reading, craft projects, or letter writing.   · You can keep track of your baby's health by noting the length of time it takes to count 10 movements (such as kicks, flutters, or rolls). Feeling 10 movements in less than 1 hour is considered normal. Track your baby's movements once each day. Bring this record with you to each prenatal visit. When should you call for help? Call 911 anytime you think you may need emergency care. For example, call if:    · You passed out (lost consciousness).     · You have a seizure. Call your doctor now or seek immediate medical care if:    · You have symptoms of preeclampsia, such as:  ? Sudden swelling of your face, hands, or feet. ? New vision problems (such as dimness, blurring, or seeing spots). ? A severe headache.     · Your blood pressure is higher than it should be, or it rises suddenly.     · You have new nausea or vomiting.     · You think that you are in labor.     · You have pain in your belly or pelvis. Watch closely for changes in your health, and be sure to contact your doctor if:    · You gain weight rapidly. Where can you learn more? Go to http://www.gray.com/  Enter Z954 in the search box to learn more about \"Preeclampsia: Care Instructions. \"  Current as of: February 11, 2020               Content Version: 12.6  © 3318-8690 [employer]. Care instructions adapted under license by [employer] (which disclaims liability or warranty for this information). If you have questions about a medical condition or this instruction, always ask your healthcare professional. [place] [address] any warranty or liability for your use of this information. Migraine Headache: Care Instructions  Your Care Instructions     Migraines are painful, throbbing headaches that often start on one side of the head. They may cause nausea and vomiting and make you sensitive to light, sound, or smell. Without treatment, migraines can last from 4 hours to a few days.  Medicines can help prevent migraines or stop them after they have started. Your doctor can help you find which ones work best for you. Follow-up care is a key part of your treatment and safety. Be sure to make and go to all appointments, and call your doctor if you are having problems. It's also a good idea to know your test results and keep a list of the medicines you take. How can you care for yourself at home? · Do not drive if you have taken a prescription pain medicine. · Rest in a quiet, dark room until your headache is gone. Close your eyes, and try to relax or go to sleep. Don't watch TV or read. · Put a cold, moist cloth or cold pack on the painful area for 10 to 20 minutes at a time. Put a thin cloth between the cold pack and your skin. · Use a warm, moist towel or a heating pad set on low to relax tight shoulder and neck muscles. · Have someone gently massage your neck and shoulders. · Take your medicines exactly as prescribed. Call your doctor if you think you are having a problem with your medicine. You will get more details on the specific medicines your doctor prescribes. · Don't take medicine for headache pain too often. Talk to your doctor if you are taking medicine more than 2 days a week to stop a headache. Taking too much pain medicine can lead to more headaches. These are called medicine-overuse headaches. To prevent migraines  · Keep a headache diary so you can figure out what triggers your headaches. Avoiding triggers may help you prevent headaches. Record when each headache began, how long it lasted, and what the pain was like. Write down any other symptoms you had with the headache, such as nausea, flashing lights or dark spots, or sensitivity to bright light or loud noise. Note if the headache occurred near your period. List anything that might have triggered the headache. Triggers may include certain foods (chocolate, cheese, wine) or odors, smoke, bright light, stress, or lack of sleep.   · If your doctor has prescribed medicine for your migraines, take it as directed. You may have medicine that you take only when you get a migraine and medicine that you take all the time to help prevent migraines. ? If your doctor has prescribed medicine for when you get a headache, take it at the first sign of a migraine, unless your doctor has given you other instructions. ? If your doctor has prescribed medicine to prevent migraines, take it exactly as prescribed. Call your doctor if you think you are having a problem with your medicine. · Find healthy ways to deal with stress. Migraines are most common during or right after stressful times. Try finding ways to reduce stress like practicing mindfulness or deep breathing exercises. · Get plenty of sleep and exercise. But be careful to not push yourself too hard during exercise. It may trigger a headache. · Eat meals on a regular schedule. Avoid foods and drinks that often trigger migraines. These include chocolate, alcohol (especially red wine and port), aspartame, monosodium glutamate (MSG), and some additives found in foods (such as hot dogs, ko, cold cuts, aged cheeses, and pickled foods). · Limit caffeine. Don't drink too much coffee, tea, or soda. But don't quit caffeine suddenly. That can also give you migraines. · Do not smoke or allow others to smoke around you. If you need help quitting, talk to your doctor about stop-smoking programs and medicines. These can increase your chances of quitting for good. · If you are taking birth control pills or hormone therapy, talk to your doctor about whether they are triggering your migraines. When should you call for help? Call 911 anytime you think you may need emergency care. For example, call if:    · You have signs of a stroke. These may include:  ? Sudden numbness, paralysis, or weakness in your face, arm, or leg, especially on only one side of your body. ? Sudden vision changes.   ? Sudden trouble speaking. ? Sudden confusion or trouble understanding simple statements. ? Sudden problems with walking or balance. ? A sudden, severe headache that is different from past headaches. Call your doctor now or seek immediate medical care if:    · You have new or worse nausea and vomiting.     · You have a new or higher fever.     · Your headache gets much worse. Watch closely for changes in your health, and be sure to contact your doctor if:    · You are not getting better after 2 days (48 hours). Where can you learn more? Go to http://www.gray.com/  Enter U641 in the search box to learn more about \"Migraine Headache: Care Instructions. \"  Current as of: November 20, 2019               Content Version: 12.6  © 1098-1564 [employer]. Care instructions adapted under license by [employer] (which disclaims liability or warranty for this information). If you have questions about a medical condition or this instruction, always ask your healthcare professional. [address] any warranty or liability for your use of this information.

## 2020-12-20 NOTE — DISCHARGE SUMMARY
Antepartum Discharge Summary     Name: Lavell Ronquillo MRN: [id number]  SSN: [id number]    YOB: 1995  Age: 26 y.o. Sex: female      Allergies: Citrus and derivatives, Seafood, and Lactose    Admit Date: 12/19/2020    Discharge Date: 12/20/2020     Admitting Physician: Savanna Amato MD     Attending Physician:  William Spence DO     * Admission Diagnoses: 28 weeks gestation of pregnancy [Z3A.28]    * Discharge Diagnoses:   Hospital Problems as of 12/20/2020 Date Reviewed: 12/20/2020          Codes Class Noted - Resolved POA    Acute headache ICD-10-CM: R51.9  ICD-9-CM: 784.0  12/20/2020 - Present Unknown        Floaters in visual field, left ICD-10-CM: A37.483  ICD-9-CM: 379.24  12/20/2020 - Present Unknown        28 weeks gestation of pregnancy ICD-10-CM: Z3A.28  ICD-9-CM: V22.2  12/19/2020 - Present Unknown             Lab Results   Component Value Date/Time    Rubella, External Immune 07/30/2020    ABO,Rh O Positive 07/30/2020      There is no immunization history on file for this patient. * Discharge Condition: improved    * Procedures: medical management of ha  * No surgery found [facility] Course:    - headache broken with oral medications, lab eval for pih/pet negative    * Disposition: Home    Discharge Medications:   Current Discharge Medication List      CONTINUE these medications which have NOT CHANGED    Details   omega-3 acid ethyl esters (LOVAZA) 1 gram capsule Take 2 g by mouth two (2) times a day. PNV Comb #2-Iron-FA-Omega 3 29-1-400 mg cmpk Take  by mouth. ascorbic acid, vitamin C, (Vitamin C) 250 mg tablet Take  by mouth. STOP taking these medications       metroNIDAZOLE (Metrogel VaginaL) 0.75 % gel Comments:   Reason for Stopping:               * Follow-up Care/Patient Instructions:   Activity: Activity as tolerated  Diet: Regular Diet  Wound Care: None needed    Follow-up Information     Follow up With Specialties Details Why Contact Info Charmaine Sellers, [place] Physician Assistant   [id number] [id number] [phone] DISPLAY PLAN FREE TEXT

## 2020-12-20 NOTE — PROGRESS NOTES
1950: Patient arrives from home complaining of headache since Wednesday, unrelieved by tylenol. Reports positive fetal movement, no bleeding, leakage of fluid, or contractions. 1958: Vikas Kilgore CNM at bedside. 2055: Patient down to CT.  2100: Patient returned from CT, decision made from patient not to continue with head CT due to concerns for radiation to the baby. MD notified. 2110: Dr. Osmin Toledo and Vikas Kilgore at bedside discussing risks and benefits to head C, patient in agreement with plan. 2125: Patient receiving phenergan IV, will go for head CT once administration is complete.

## 2020-12-21 ENCOUNTER — HOSPITAL ENCOUNTER (EMERGENCY)
Age: 25
Discharge: LEFT BEFORE DISCHARGE | End: 2020-12-21
Attending: EMERGENCY MEDICINE
Payer: COMMERCIAL

## 2020-12-21 VITALS
OXYGEN SATURATION: 100 % | DIASTOLIC BLOOD PRESSURE: 79 MMHG | HEART RATE: 100 BPM | SYSTOLIC BLOOD PRESSURE: 118 MMHG | TEMPERATURE: 98.9 F | RESPIRATION RATE: 16 BRPM

## 2020-12-21 PROCEDURE — 75810000275 HC EMERGENCY DEPT VISIT NO LEVEL OF CARE

## 2020-12-22 NOTE — ED TRIAGE NOTES
29 weeks pregnant. She was seen last Wednesday for a headache and was told if it got worse to come back. She says it has never gone away and was an 9/10 prior to arrival. She admits nausea without vomiting.

## 2021-01-08 ENCOUNTER — HOSPITAL ENCOUNTER (EMERGENCY)
Age: 26
Discharge: HOME | End: 2021-01-09
Attending: EMERGENCY MEDICINE
Payer: COMMERCIAL

## 2021-01-08 VITALS
TEMPERATURE: 98.5 F | HEIGHT: 67 IN | HEART RATE: 99 BPM | RESPIRATION RATE: 20 BRPM | DIASTOLIC BLOOD PRESSURE: 74 MMHG | OXYGEN SATURATION: 100 % | SYSTOLIC BLOOD PRESSURE: 119 MMHG | BODY MASS INDEX: 31.14 KG/M2 | WEIGHT: 198.41 LBS

## 2021-01-08 DIAGNOSIS — L29.9 PRURITUS: ICD-10-CM

## 2021-01-08 DIAGNOSIS — K83.1 CHOLESTASIS: Primary | ICD-10-CM

## 2021-01-08 LAB
ALBUMIN SERPL-MCNC: 3 G/DL (ref 3.5–5)
ALBUMIN/GLOB SERPL: 0.7 {RATIO} (ref 1.1–2.2)
ALP SERPL-CCNC: 85 U/L (ref 45–117)
ALT SERPL-CCNC: 19 U/L (ref 12–78)
ANION GAP SERPL CALC-SCNC: 6 MMOL/L (ref 5–15)
AST SERPL-CCNC: 14 U/L (ref 15–37)
BASOPHILS # BLD: 0 K/UL (ref 0–0.1)
BASOPHILS NFR BLD: 0 % (ref 0–1)
BILIRUB DIRECT SERPL-MCNC: 0.1 MG/DL (ref 0–0.2)
BILIRUB SERPL-MCNC: 0.2 MG/DL (ref 0.2–1)
BUN SERPL-MCNC: 6 MG/DL (ref 6–20)
BUN/CREAT SERPL: 8 (ref 12–20)
CALCIUM SERPL-MCNC: 9.1 MG/DL (ref 8.5–10.1)
CHLORIDE SERPL-SCNC: 105 MMOL/L (ref 97–108)
CO2 SERPL-SCNC: 25 MMOL/L (ref 21–32)
COMMENT, HOLDF: NORMAL
CREAT SERPL-MCNC: 0.73 MG/DL (ref 0.55–1.02)
DIFFERENTIAL METHOD BLD: ABNORMAL
EOSINOPHIL # BLD: 0.2 K/UL (ref 0–0.4)
EOSINOPHIL NFR BLD: 2 % (ref 0–7)
ERYTHROCYTE [DISTWIDTH] IN BLOOD BY AUTOMATED COUNT: 12.7 % (ref 11.5–14.5)
GLOBULIN SER CALC-MCNC: 4.3 G/DL (ref 2–4)
GLUCOSE SERPL-MCNC: 113 MG/DL (ref 65–100)
HCT VFR BLD AUTO: 33.8 % (ref 35–47)
HGB BLD-MCNC: 11.4 G/DL (ref 11.5–16)
IMM GRANULOCYTES # BLD AUTO: 0.1 K/UL (ref 0–0.04)
IMM GRANULOCYTES NFR BLD AUTO: 1 % (ref 0–0.5)
LIPASE SERPL-CCNC: 65 U/L (ref 73–393)
LYMPHOCYTES # BLD: 2 K/UL (ref 0.8–3.5)
LYMPHOCYTES NFR BLD: 20 % (ref 12–49)
MCH RBC QN AUTO: 31.2 PG (ref 26–34)
MCHC RBC AUTO-ENTMCNC: 33.7 G/DL (ref 30–36.5)
MCV RBC AUTO: 92.6 FL (ref 80–99)
MONOCYTES # BLD: 0.6 K/UL (ref 0–1)
MONOCYTES NFR BLD: 6 % (ref 5–13)
NEUTS SEG # BLD: 7 K/UL (ref 1.8–8)
NEUTS SEG NFR BLD: 71 % (ref 32–75)
NRBC # BLD: 0 K/UL (ref 0–0.01)
NRBC BLD-RTO: 0 PER 100 WBC
PLATELET # BLD AUTO: 225 K/UL (ref 150–400)
PMV BLD AUTO: 10.7 FL (ref 8.9–12.9)
POTASSIUM SERPL-SCNC: 3.3 MMOL/L (ref 3.5–5.1)
PROT SERPL-MCNC: 7.3 G/DL (ref 6.4–8.2)
RBC # BLD AUTO: 3.65 M/UL (ref 3.8–5.2)
SAMPLES BEING HELD,HOLD: NORMAL
SODIUM SERPL-SCNC: 136 MMOL/L (ref 136–145)
WBC # BLD AUTO: 9.9 K/UL (ref 3.6–11)

## 2021-01-08 PROCEDURE — 82239 BILE ACIDS TOTAL: CPT

## 2021-01-08 PROCEDURE — 85025 COMPLETE CBC W/AUTO DIFF WBC: CPT

## 2021-01-08 PROCEDURE — 99283 EMERGENCY DEPT VISIT LOW MDM: CPT

## 2021-01-08 PROCEDURE — 83690 ASSAY OF LIPASE: CPT

## 2021-01-08 PROCEDURE — 80076 HEPATIC FUNCTION PANEL: CPT

## 2021-01-08 PROCEDURE — 36415 COLL VENOUS BLD VENIPUNCTURE: CPT

## 2021-01-08 PROCEDURE — 80048 BASIC METABOLIC PNL TOTAL CA: CPT

## 2021-01-08 PROCEDURE — 74011250636 HC RX REV CODE- 250/636: Performed by: PHYSICIAN ASSISTANT

## 2021-01-08 RX ORDER — DIPHENHYDRAMINE HCL 25 MG
50 CAPSULE ORAL
Qty: 30 CAP | Refills: 0 | Status: SHIPPED | OUTPATIENT
Start: 2021-01-08

## 2021-01-08 RX ADMIN — SODIUM CHLORIDE 1000 ML: 9 INJECTION, SOLUTION INTRAVENOUS at 20:32

## 2021-01-08 NOTE — ED NOTES
Triage Note: Patient is coming in for itching for the past month but it continues. Patient is unable to stop scratching . Patient is 31 weeks pregnant.
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
## 2021-01-11 LAB — BILE AC SERPL-SCNC: 5.3 UMOL/L (ref 0–10)

## 2021-01-12 NOTE — ADT AUTH CERT NOTES
PREVIOUSLY DENIED FOR INPATIENT DOWNGRADED TO OBSERVATION REF #  [id number]      PLEASE FAX FORM  OR CALL BACK TO NOTIFY IF  AUTHORIZATION FOR OBSERVATION IS OR IS NOT REQUIRED  PHONE # [phone]  FAX # [phone]

## 2021-01-20 ENCOUNTER — HOSPITAL ENCOUNTER (EMERGENCY)
Age: 26
Discharge: HOME | End: 2021-01-20
Attending: OBSTETRICS & GYNECOLOGY
Payer: COMMERCIAL

## 2021-01-20 VITALS
SYSTOLIC BLOOD PRESSURE: 117 MMHG | TEMPERATURE: 99 F | RESPIRATION RATE: 16 BRPM | HEART RATE: 101 BPM | DIASTOLIC BLOOD PRESSURE: 72 MMHG

## 2021-01-20 PROCEDURE — 99283 EMERGENCY DEPT VISIT LOW MDM: CPT

## 2021-01-20 NOTE — DISCHARGE INSTRUCTIONS
DECREASED FETAL MOVEMENT DISCHARGE INSTRUCTIONS    Name: Mima Skiff  YOB: 1995  Primary Diagnosis: Active Problems:    * No active hospital problems. *      Introduction: You came to the hospital because your baby is not moving as much as usual. This information may help you decide when you need to call your care provider and return to the hospital. There are several possible reasons your baby's movements have decreased. Sometimes, the baby is simply asleep. Many times the baby simply needs extra fluids. You can provide this by lying down on your side (to increase blood flow to the womb) and drinking a large glass of fluid. If this does not make the baby move four times in one hour, you need to contact your care provider as soon as possible. General:     Lay on your left side and drink a full glass of orange juice and a snack, and call your doctor if you don't feel any movement. Diet/Diet Restrictions:      Anything you like/tolerate, Follow your regularly prescribed diet and Drink 8-10 glasses of water each day. Avoid beverages with caffeine. Physical Activity / Restrictions / Safety:     As tolerated. Other:        Discharge Instructions/ Special Treatment/ Home Care Needs:     Call your provider if:   Your baby is not moving as much as usual-at least 4 fetal movements in 1 hour after drinking and resting on your side for 1 hour.  You have regular, painful contractions every 5 minutes or less for one hour. Time your contractions from the beginning of one to the beginning of the next.  You have a gush of fluid or blood from your vagina (it is normal to have spotting after vaginal exam or intercourse).    Other:    labor instructions:    Uterine cramping (menstrual-like cramps, intermittent or constant   Uterine contractions every 10-15 minutes or more frequently   Low abdominal pressure (pelvic pressure)   Dull low backache (intermittent or constant)   Increase or change in vaginal discharge   Feeling that the baby is \"pushing down\"   Abdominal cramping with or without diarrhea  If any of these symptoms are experienced, stop what you are doing, lie down on your side, drink two to three glasses of water and wait one hour. If the symptoms persist or get worse, call your provider. Pain Management:             Discharge Checklist-NURSING TO COMPLETE:     Date and Time of Discharge: Date: 1/20/2021 Time: 10:53 AM    Return of:   Dental Appliance:    Vision: Visual Aid: None  Hearing Aid:    Jewelry:    Clothing:    Other Valuables:    Valuables sent to safe:      Prescription Given: no  Medication Instruction Sheet(s), including side effects, provided: no    Accompanied By: self    Mode of Transportation:    Discharge Disposition: Home    I have had the opportunity to make my options or choices for discharge. I have received and understand these instructions.
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
## 2021-01-20 NOTE — PROGRESS NOTES
3140 Pt arrived to [facility] with complaint of decreased fetal movement. 1867 Dr. Jailene Jewell called at this time. [address] Dr. Jailene Jewell would like for Dr. Ying Wheatley to see pt.    1100 Pt being prepared for d/c.    1110 Pt discharged and given instructions on what to do if she experiences decreased fetal movement again.

## 2021-01-22 ENCOUNTER — HOSPITAL ENCOUNTER (EMERGENCY)
Age: 26
Discharge: HOME | End: 2021-01-22
Payer: COMMERCIAL

## 2021-01-22 VITALS
SYSTOLIC BLOOD PRESSURE: 134 MMHG | WEIGHT: 207 LBS | DIASTOLIC BLOOD PRESSURE: 78 MMHG | HEIGHT: 68 IN | HEART RATE: 100 BPM | TEMPERATURE: 98 F | BODY MASS INDEX: 31.37 KG/M2

## 2021-01-22 PROCEDURE — 99285 EMERGENCY DEPT VISIT HI MDM: CPT

## 2021-01-22 PROCEDURE — 76815 OB US LIMITED FETUS(S): CPT

## 2021-01-23 NOTE — DISCHARGE INSTRUCTIONS
Patient Education        Weeks 32 to 29 of Your Pregnancy: Care Instructions  Your Care Instructions     During the last few weeks of your pregnancy, you may have more aches and pains. It's important to rest when you can. Your growing baby is putting more pressure on your bladder. So you may need to urinate more often. Hemorrhoids are also common. These are painful, itchy veins in the rectal area. In the 36th week, most women have a test for group B streptococcus (GBS). GBS is a common bacteria that can live in the vagina and rectum. It can make your baby sick after birth. If you test positive, you will get antibiotics during labor. These will keep your baby from getting the bacteria. You may want to talk with your doctor about banking your baby's umbilical cord blood. This is the blood left in the cord after birth. If you want to save this blood, you must arrange it ahead of time. You can't decide at the last minute. If you haven't already had the Tdap shot during this pregnancy, talk to your doctor about getting it. It will help protect your  against pertussis infection. Follow-up care is a key part of your treatment and safety. Be sure to make and go to all appointments, and call your doctor if you are having problems. It's also a good idea to know your test results and keep a list of the medicines you take. How can you care for yourself at home? Ease hemorrhoids  · Get more liquids, fruits, vegetables, and fiber in your diet. This will help keep your stools soft. · Avoid sitting for too long. Lie on your left side several times a day. · Clean yourself with soft, moist toilet paper. Or you can use witch hazel pads or personal hygiene pads. · If you are uncomfortable, try ice packs. Or you can sit in a warm sitz bath. Do these for 20 minutes at a time, as needed. · Use hydrocortisone cream for pain and itching. Two examples are Anusol and Preparation H Hydrocortisone.   · Ask your doctor about taking an over-the-counter stool softener. Consider breastfeeding  · Experts recommend that women breastfeed for 1 year or longer. · Breast milk may help protect your child from some health problems.  babies are less likely than formula-fed babies to:  ? Get ear infections, colds, diarrhea, and pneumonia. ? Be obese or get diabetes later in life. · Women who breastfeed have less bleeding after the birth. Their uteruses also shrink back faster. · Some women who breastfeed lose weight faster. Making milk burns calories. · Breastfeeding can lower your risk of breast cancer, ovarian cancer, and osteoporosis. Decide about circumcision for boys  · As you make this decision, it may help to think about your personal, Judaism, and family traditions. You get to decide if you will keep your son's penis natural or if he will be circumcised. · If you decide that you would like to have your baby circumcised, talk with your doctor. You can share your concerns about pain. And you can discuss your preferences for anesthesia. Where can you learn more? Go to http://www.[employer].com/  Enter X711 in the search box to learn more about \"Weeks 32 to 34 of Your Pregnancy: Care Instructions. \"  Current as of: February 11, 2020               Content Version: 12.6  © 6463-6510 [employer]. Care instructions adapted under license by [employer] (which disclaims liability or warranty for this information). If you have questions about a medical condition or this instruction, always ask your healthcare professional. [place] [address] any warranty or liability for your use of this information.

## 2021-01-23 NOTE — PROGRESS NOTES
1905 patient arrived to [facility] complaining of leaking of fluid starting one hour ago in the shower. Amnisure test performed, provider notified. 1925 Dr Russell Tanner at bedside to assess patient.
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
## 2021-02-09 ENCOUNTER — HOSPITAL ENCOUNTER (INPATIENT)
Age: 26
LOS: 4 days | Discharge: HOME | DRG: 832 | End: 2021-02-13
Attending: OBSTETRICS & GYNECOLOGY | Admitting: OBSTETRICS & GYNECOLOGY
Payer: COMMERCIAL

## 2021-02-09 DIAGNOSIS — F41.9 ANXIETY: Primary | ICD-10-CM

## 2021-02-09 PROBLEM — O46.93 VAGINAL BLEEDING IN PREGNANCY, THIRD TRIMESTER: Status: ACTIVE | Noted: 2021-02-09

## 2021-02-09 LAB
ERYTHROCYTE [DISTWIDTH] IN BLOOD BY AUTOMATED COUNT: 13.2 % (ref 11.5–14.5)
HCT VFR BLD AUTO: 33.1 % (ref 35–47)
HGB BLD-MCNC: 11 G/DL (ref 11.5–16)
MCH RBC QN AUTO: 30.8 PG (ref 26–34)
MCHC RBC AUTO-ENTMCNC: 33.2 G/DL (ref 30–36.5)
MCV RBC AUTO: 92.7 FL (ref 80–99)
NRBC # BLD: 0 K/UL (ref 0–0.01)
NRBC BLD-RTO: 0 PER 100 WBC
PLATELET # BLD AUTO: 198 K/UL (ref 150–400)
PMV BLD AUTO: 11.8 FL (ref 8.9–12.9)
RBC # BLD AUTO: 3.57 M/UL (ref 3.8–5.2)
WBC # BLD AUTO: 10.1 K/UL (ref 3.6–11)

## 2021-02-09 PROCEDURE — 85027 COMPLETE CBC AUTOMATED: CPT

## 2021-02-09 PROCEDURE — 96372 THER/PROPH/DIAG INJ SC/IM: CPT

## 2021-02-09 PROCEDURE — 65410000002 HC RM PRIVATE OB

## 2021-02-09 PROCEDURE — 99218 HC RM OBSERVATION: CPT

## 2021-02-09 PROCEDURE — 99284 EMERGENCY DEPT VISIT MOD MDM: CPT

## 2021-02-09 PROCEDURE — 74011250636 HC RX REV CODE- 250/636: Performed by: OBSTETRICS & GYNECOLOGY

## 2021-02-09 PROCEDURE — 36415 COLL VENOUS BLD VENIPUNCTURE: CPT

## 2021-02-09 RX ORDER — SODIUM CHLORIDE 0.9 % (FLUSH) 0.9 %
5-40 SYRINGE (ML) INJECTION AS NEEDED
Status: DISCONTINUED | OUTPATIENT
Start: 2021-02-09 | End: 2021-02-13 | Stop reason: HOSPADM

## 2021-02-09 RX ORDER — DIPHENHYDRAMINE HCL 25 MG
25 CAPSULE ORAL
Status: DISCONTINUED | OUTPATIENT
Start: 2021-02-09 | End: 2021-02-13 | Stop reason: HOSPADM

## 2021-02-09 RX ORDER — MAG HYDROX/ALUMINUM HYD/SIMETH 200-200-20
30 SUSPENSION, ORAL (FINAL DOSE FORM) ORAL
Status: DISCONTINUED | OUTPATIENT
Start: 2021-02-09 | End: 2021-02-13 | Stop reason: HOSPADM

## 2021-02-09 RX ORDER — SWAB
1 SWAB, NON-MEDICATED MISCELLANEOUS DAILY
Status: DISCONTINUED | OUTPATIENT
Start: 2021-02-10 | End: 2021-02-13 | Stop reason: HOSPADM

## 2021-02-09 RX ORDER — SODIUM CHLORIDE 0.9 % (FLUSH) 0.9 %
5-40 SYRINGE (ML) INJECTION EVERY 8 HOURS
Status: DISCONTINUED | OUTPATIENT
Start: 2021-02-09 | End: 2021-02-13 | Stop reason: HOSPADM

## 2021-02-09 RX ORDER — BETAMETHASONE SODIUM PHOSPHATE AND BETAMETHASONE ACETATE 3; 3 MG/ML; MG/ML
12 INJECTION, SUSPENSION INTRA-ARTICULAR; INTRALESIONAL; INTRAMUSCULAR; SOFT TISSUE EVERY 24 HOURS
Status: COMPLETED | OUTPATIENT
Start: 2021-02-09 | End: 2021-02-10

## 2021-02-09 RX ORDER — ACETAMINOPHEN 325 MG/1
650 TABLET ORAL
Status: DISCONTINUED | OUTPATIENT
Start: 2021-02-09 | End: 2021-02-13 | Stop reason: HOSPADM

## 2021-02-09 RX ADMIN — BETAMETHASONE SODIUM PHOSPHATE AND BETAMETHASONE ACETATE 12 MG: 3; 3 INJECTION, SUSPENSION INTRA-ARTICULAR; INTRALESIONAL; INTRAMUSCULAR at 18:46
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
## 2021-02-10 ENCOUNTER — APPOINTMENT (OUTPATIENT)
Dept: NON INVASIVE DIAGNOSTICS | Age: 26
DRG: 832 | End: 2021-02-10
Attending: INTERNAL MEDICINE
Payer: COMMERCIAL

## 2021-02-10 LAB
ALBUMIN SERPL-MCNC: 3 G/DL (ref 3.5–5)
ALBUMIN/GLOB SERPL: 0.7 {RATIO} (ref 1.1–2.2)
ALP SERPL-CCNC: 117 U/L (ref 45–117)
ALT SERPL-CCNC: 39 U/L (ref 12–78)
ANION GAP SERPL CALC-SCNC: 11 MMOL/L (ref 5–15)
AST SERPL-CCNC: 18 U/L (ref 15–37)
ATRIAL RATE: 99 BPM
BASOPHILS # BLD: 0 K/UL (ref 0–0.1)
BASOPHILS NFR BLD: 0 % (ref 0–1)
BILIRUB SERPL-MCNC: 0.2 MG/DL (ref 0.2–1)
BUN SERPL-MCNC: 8 MG/DL (ref 6–20)
BUN/CREAT SERPL: 9 (ref 12–20)
CALCIUM SERPL-MCNC: 9.7 MG/DL (ref 8.5–10.1)
CALCULATED P AXIS, ECG09: 71 DEGREES
CALCULATED R AXIS, ECG10: 76 DEGREES
CALCULATED T AXIS, ECG11: 42 DEGREES
CHLORIDE SERPL-SCNC: 107 MMOL/L (ref 97–108)
CO2 SERPL-SCNC: 18 MMOL/L (ref 21–32)
COMMENT, HOLDF: NORMAL
COMMENT, HOLDF: NORMAL
CREAT SERPL-MCNC: 0.89 MG/DL (ref 0.55–1.02)
DIAGNOSIS, 93000: NORMAL
DIFFERENTIAL METHOD BLD: ABNORMAL
ECHO AO ROOT DIAM: 2.87 CM
ECHO AV PEAK GRADIENT: 10.91 MMHG
ECHO AV PEAK VELOCITY: 165.15 CM/S
ECHO LA MAJOR AXIS: 3.76 CM
ECHO LA MINOR AXIS: 1.78 CM
ECHO LV E' LATERAL VELOCITY: 16.94 CM/S
ECHO LV E' SEPTAL VELOCITY: 14.89 CM/S
ECHO LV INTERNAL DIMENSION DIASTOLIC: 4.46 CM (ref 3.9–5.3)
ECHO LV INTERNAL DIMENSION SYSTOLIC: 2.82 CM
ECHO LV IVSD: 0.87 CM (ref 0.6–0.9)
ECHO LV MASS 2D: 128.9 G (ref 67–162)
ECHO LV MASS INDEX 2D: 61 G/M2 (ref 43–95)
ECHO LV POSTERIOR WALL DIASTOLIC: 0.91 CM (ref 0.6–0.9)
ECHO LVOT PEAK GRADIENT: 4.07 MMHG
ECHO LVOT PEAK VELOCITY: 100.92 CM/S
ECHO MV A VELOCITY: 79.73 CM/S
ECHO MV AREA PHT: 5.09 CM2
ECHO MV E DECELERATION TIME (DT): 148.98 MS
ECHO MV E VELOCITY: 97.08 CM/S
ECHO MV E/A RATIO: 1.22
ECHO MV E/E' LATERAL: 5.73
ECHO MV E/E' RATIO (AVERAGED): 6.13
ECHO MV E/E' SEPTAL: 6.52
ECHO MV PRESSURE HALF TIME (PHT): 43.2 MS
ECHO PV MAX VELOCITY: 130.16 CM/S
ECHO PV PEAK INSTANTANEOUS GRADIENT SYSTOLIC: 6.78 MMHG
ECHO RV TAPSE: 3.45 CM (ref 1.5–2)
EOSINOPHIL # BLD: 0 K/UL (ref 0–0.4)
EOSINOPHIL NFR BLD: 0 % (ref 0–7)
ERYTHROCYTE [DISTWIDTH] IN BLOOD BY AUTOMATED COUNT: 13.2 % (ref 11.5–14.5)
GLOBULIN SER CALC-MCNC: 4.4 G/DL (ref 2–4)
GLUCOSE SERPL-MCNC: 145 MG/DL (ref 65–100)
HCT VFR BLD AUTO: 36 % (ref 35–47)
HGB BLD-MCNC: 12 G/DL (ref 11.5–16)
IMM GRANULOCYTES # BLD AUTO: 0.2 K/UL (ref 0–0.04)
IMM GRANULOCYTES NFR BLD AUTO: 1 % (ref 0–0.5)
LYMPHOCYTES # BLD: 2.1 K/UL (ref 0.8–3.5)
LYMPHOCYTES NFR BLD: 13 % (ref 12–49)
MAGNESIUM SERPL-MCNC: 1.8 MG/DL (ref 1.6–2.4)
MCH RBC QN AUTO: 30.4 PG (ref 26–34)
MCHC RBC AUTO-ENTMCNC: 33.3 G/DL (ref 30–36.5)
MCV RBC AUTO: 91.1 FL (ref 80–99)
MONOCYTES # BLD: 1 K/UL (ref 0–1)
MONOCYTES NFR BLD: 6 % (ref 5–13)
NEUTS SEG # BLD: 13.2 K/UL (ref 1.8–8)
NEUTS SEG NFR BLD: 80 % (ref 32–75)
NRBC # BLD: 0.02 K/UL (ref 0–0.01)
NRBC BLD-RTO: 0.1 PER 100 WBC
P-R INTERVAL, ECG05: 108 MS
PLATELET # BLD AUTO: 233 K/UL (ref 150–400)
PMV BLD AUTO: 11.7 FL (ref 8.9–12.9)
POTASSIUM SERPL-SCNC: 3.8 MMOL/L (ref 3.5–5.1)
PROT SERPL-MCNC: 7.4 G/DL (ref 6.4–8.2)
Q-T INTERVAL, ECG07: 326 MS
QRS DURATION, ECG06: 70 MS
QTC CALCULATION (BEZET), ECG08: 418 MS
RBC # BLD AUTO: 3.95 M/UL (ref 3.8–5.2)
SAMPLES BEING HELD,HOLD: NORMAL
SAMPLES BEING HELD,HOLD: NORMAL
SODIUM SERPL-SCNC: 136 MMOL/L (ref 136–145)
TROPONIN I SERPL-MCNC: <0.05 NG/ML
TSH SERPL DL<=0.05 MIU/L-ACNC: 2.6 UIU/ML (ref 0.36–3.74)
UR CULT HOLD, URHOLD: NORMAL
VENTRICULAR RATE, ECG03: 99 BPM
WBC # BLD AUTO: 16.5 K/UL (ref 3.6–11)

## 2021-02-10 PROCEDURE — 84484 ASSAY OF TROPONIN QUANT: CPT

## 2021-02-10 PROCEDURE — 93005 ELECTROCARDIOGRAM TRACING: CPT

## 2021-02-10 PROCEDURE — 99218 HC RM OBSERVATION: CPT

## 2021-02-10 PROCEDURE — 93306 TTE W/DOPPLER COMPLETE: CPT

## 2021-02-10 PROCEDURE — 74011250636 HC RX REV CODE- 250/636: Performed by: OBSTETRICS & GYNECOLOGY

## 2021-02-10 PROCEDURE — 84156 ASSAY OF PROTEIN URINE: CPT

## 2021-02-10 PROCEDURE — 74011250637 HC RX REV CODE- 250/637: Performed by: INTERNAL MEDICINE

## 2021-02-10 PROCEDURE — 80053 COMPREHEN METABOLIC PANEL: CPT

## 2021-02-10 PROCEDURE — 65660000000 HC RM CCU STEPDOWN

## 2021-02-10 PROCEDURE — 84443 ASSAY THYROID STIM HORMONE: CPT

## 2021-02-10 PROCEDURE — 36415 COLL VENOUS BLD VENIPUNCTURE: CPT

## 2021-02-10 PROCEDURE — 85025 COMPLETE CBC W/AUTO DIFF WBC: CPT

## 2021-02-10 PROCEDURE — 59025 FETAL NON-STRESS TEST: CPT

## 2021-02-10 PROCEDURE — 83735 ASSAY OF MAGNESIUM: CPT

## 2021-02-10 PROCEDURE — 74011250637 HC RX REV CODE- 250/637: Performed by: OBSTETRICS & GYNECOLOGY

## 2021-02-10 PROCEDURE — 96372 THER/PROPH/DIAG INJ SC/IM: CPT

## 2021-02-10 RX ORDER — METOPROLOL TARTRATE 25 MG/1
12.5 TABLET, FILM COATED ORAL 2 TIMES DAILY
Status: DISCONTINUED | OUTPATIENT
Start: 2021-02-10 | End: 2021-02-11

## 2021-02-10 RX ORDER — METOPROLOL TARTRATE 5 MG/5ML
1.25 INJECTION INTRAVENOUS
Status: DISCONTINUED | OUTPATIENT
Start: 2021-02-10 | End: 2021-02-13 | Stop reason: HOSPADM

## 2021-02-10 RX ORDER — METOPROLOL TARTRATE 5 MG/5ML
2.5 INJECTION INTRAVENOUS
Status: DISCONTINUED | OUTPATIENT
Start: 2021-02-10 | End: 2021-02-10

## 2021-02-10 RX ADMIN — METOPROLOL TARTRATE 12.5 MG: 25 TABLET, FILM COATED ORAL at 12:20

## 2021-02-10 RX ADMIN — Medication 10 ML: at 15:12

## 2021-02-10 RX ADMIN — ACETAMINOPHEN 650 MG: 325 TABLET ORAL at 00:27

## 2021-02-10 RX ADMIN — METOPROLOL TARTRATE 12.5 MG: 25 TABLET, FILM COATED ORAL at 20:32

## 2021-02-10 RX ADMIN — Medication 10 ML: at 22:07

## 2021-02-10 RX ADMIN — BETAMETHASONE SODIUM PHOSPHATE AND BETAMETHASONE ACETATE 12 MG: 3; 3 INJECTION, SUSPENSION INTRA-ARTICULAR; INTRALESIONAL; INTRAMUSCULAR at 18:46

## 2021-02-10 NOTE — PROGRESS NOTES
AntePartum Progress Note    Gordo Powell  36w0d    CTSP s/t  and pt not feeling well. Patient admitted for vaginal bleeding  in pregnancy. 36w0d Estimated Date of Delivery: 3/10/21   Pt on fetal monitor, reported to nurse feeling a little worried bc fetal HR was a bit lower than yesterday. Shortly thereafter reported feeling badly and maternal HR sally as high as 170 for a minute or so. Declined after that and over past 30 mins has had several episodes when her HR gets as high as 140, overall running around 120. She denies outright CP or SOB but not really able to describe her sxs. Complained of a few episodes of a cramping feeling in her buttock. Reports BLE edema yesterday and some pain in right leg which has now resolved. Reports she does have a h/o anxiety but has never felt like this before and hasn't been bothered by anxiety in a long time. Admitted for bleeding. Reports she had a small amount of brown blood this am.  Denies abd pain, cramping, ctx    Vitals:    Patient Vitals for the past 24 hrs:   BP Temp Pulse Resp SpO2 Height Weight   02/10/21 0824 108/68 98.4 °F (36.9 °C) 90 16 97 % -- --   02/10/21 0027 131/74 98.2 °F (36.8 °C) 99 16 98 % -- --   21 2038 121/68 98.6 °F (37 °C) 98 16 99 % -- --   21 2020 120/74 98.4 °F (36.9 °C) 90 16 99 % -- --   21 1917 123/77 98.6 °F (37 °C) (!) 103 16 100 % -- --   21 1804 -- -- -- -- -- 5' 8\" (1.727 m) 98.9 kg (218 lb)   21 1746 130/78 98.2 °F (36.8 °C) (!) 110 16 -- -- --     Temp (24hrs), Av.4 °F (36.9 °C), Min:98.2 °F (36.8 °C), Max:98.6 °F (37 °C)    I&O:   No intake/output data recorded.  1901 - 02/10 0700  In: 240 [P.O.:240]  Out: 500 [Urine:500]  NST:  Reactive, moderate variability, +accels, no decels    Exam:  Patient with distress.   Appears uncomfortable at times               Abdomen soft, non-tender  RRR, LCTAB               Fundus soft and non tender               Lower extremities edema trace BLE, neg Joseph's, no calf tenderness or warmth                                           Labs:   Recent Results (from the past 24 hour(s))   CBC W/O DIFF    Collection Time: 02/09/21  6:32 PM   Result Value Ref Range    WBC 10.1 3.6 - 11.0 K/uL    RBC 3.57 (L) 3.80 - 5.20 M/uL    HGB 11.0 (L) 11.5 - 16.0 g/dL    HCT 33.1 (L) 35.0 - 47.0 %    MCV 92.7 80.0 - 99.0 FL    MCH 30.8 26.0 - 34.0 PG    MCHC 33.2 30.0 - 36.5 g/dL    RDW 13.2 11.5 - 14.5 %    PLATELET 289 123 - 625 K/uL    MPV 11.8 8.9 - 12.9 FL    NRBC 0.0 0  WBC    ABSOLUTE NRBC 0.00 0.00 - 0.01 K/uL       Assessment and Plan:   IUP @ 36w0d   Patient Active Problem List    Diagnosis Date Noted    Vaginal bleeding in pregnancy, third trimester 02/09/2021    Acute headache 12/20/2020    Floaters in visual field, left 12/20/2020    28 weeks gestation of pregnancy 12/19/2020     Admitted for vag bleeding, now with episodic tachycardia. Diff includes anxiety, arrhythmia, PE. Cardiology consulted and just arrived. Labs drawn, EKG done and will follow their recs.

## 2021-02-10 NOTE — PROGRESS NOTES
26yo arrived in L&D c/o vaginal bleeding at 28 6/7weeks, s/o Dr. Ayleen Jack, states she had bright red blood this am and was examined in OB office and has had brown old blood drainage on pad this pm,  Denies abd pain, but does not feel well. 1800  Dr. Gary Ramirez at bedside, speculum exam done, no active bleeding noted. Plan of care discussed and all answers answered. 1900  TRANSFER - OUT REPORT:    Verbal report given to BRYN Amos(name) on Swapnil Rangel  being transferred to APU(unit) for routine progression of care       Report consisted of patients Situation, Background, Assessment and   Recommendations(SBAR). Information from the following report(s) SBAR, Kardex and MAR was reviewed with the receiving nurse. Lines:   Peripheral IV 21 Left Antecubital (Active)   Site Assessment Clean, dry, & intact 21   Phlebitis Assessment 0 21   Infiltration Assessment 0 21   Dressing Status Clean, dry, & intact 21   Dressing Type Tape;Transparent 21   Hub Color/Line Status Pink;Capped 21   Action Taken Open ports on tubing capped 21   Alcohol Cap Used Yes 21        Opportunity for questions and clarification was provided.       Patient transported with:   Registered Nurse

## 2021-02-10 NOTE — ROUTINE PROCESS
Bedside and Verbal shift change report given to MARTINEZ Flores RN (oncoming nurse) by ÓSCAR Espinosa RN (offgoing nurse). Report included the following information SBAR.     -0125 Pt rates HA 4/10 after analgesics. Pt encouraged to decrease light and tv stimulation in the attempt to decrease anxiety and aid in sleep. Staff will continue to monitor.     -0242 Hourly rounding, Pt appears to be resting, quietly at this time. RR >12. Staff will continue to monitor.    -0600 Hourly rounding, Pt resting quietly states that HERRERA is not present at this time and will continue to rest, appears to be less anxious. Staff will continue to monitor.

## 2021-02-10 NOTE — CONSULTS
Cardiology Note dictated # [id number]  Imp:  Episodic tachycardia with rates up to 160 - 170 bpm. Not captured on a monitor or EKG.    Short NE interval on her 12 lead which is otherwise normal  36 weeks pregnant  Cardiac exam is unremarkable  She has a history of episodic tachycardias without significant hemodynamic symptoms  All of this is strongly suggestive of PSVT  Recommend scheduled po metoprolol and prn IV metoprolol  Labs pending  Echo ordered  Transfer to telemetry  Consider SVT ablation post delivery  Discussed with patient and boyfriend ( the father of her child)  Discussed with Dr Aripta Raman  Discussed with the nursing staff  Thank you for this referral.  Christian Delgado MD  Interventional Cardiology  [facility]

## 2021-02-10 NOTE — PROGRESS NOTES
Patient settled in new room, boyfriend bedside. Connected to tele monitor and ambulated to bathroom. She is anxious about the transfer and stated that she is more concerned about her baby than herself. Ambulating to the bathroom HR increased to 145, at rest 108, educated patient on new medication, she was overwhelmed and wanted to wait a few minutes until she took it. 1220- educated again about metoprolol, metoprolol given. 1330- Stated that she felt much better, HR now 80s, ultrasound tech in room    1430-patient resting comfortably in bed no complaints of pain, no more cramping. 1600- pt requesting doppler of fetal heart, notified BRYN shen on 3E, stated she would be over shortly to doppler HR.

## 2021-02-10 NOTE — PROGRESS NOTES
Spiritual Care Assessment/Progress Note  [facility]      NAME: Mima Skiff      MRN: [id number]  AGE: 26 y.o. SEX: female  Sikh Affiliation: Mosque   Language: English     2/10/2021     Total Time (in minutes): 10     Spiritual Assessment begun in [address] through conversation with:         [x]Patient        [] Family    [] Friend(s)        Reason for Consult: Rapid response team     Spiritual beliefs: (Please include comment if needed)     [] Identifies with a matthew tradition:         [] Supported by a matthew community:            [] Claims no spiritual orientation:           [] Seeking spiritual identity:                [] Adheres to an individual form of spirituality:           [x] Not able to assess:                           Identified resources for coping:      [] Prayer                               [] Music                  [] Guided Imagery     [] Family/friends                 [] Pet visits     [] Devotional reading                         [x] Unknown     [] Other:                                               Interventions offered during this visit: (See comments for more details)                Plan of Care:     [] Support spiritual and/or cultural needs    [] Support AMD and/or advance care planning process      [] Support grieving process   [] Coordinate Rites and/or Rituals    [] Coordination with community clergy   [] No spiritual needs identified at this time   [] Detailed Plan of Care below (See Comments)  [] Make referral to Music Therapy  [] Make referral to Pet Therapy     [] Make referral to Addiction services  [] Make referral to [facility]  [] Make referral to Spiritual Care Partner  [] No future visits requested        [x] Follow up upon further referrals     Comments:  responded to RRT. Pt was being attended to. Pt's partner was present providing comfort.  talked with staff and them know of  support.  Please contact Spiritual [facility] for further support.      [address] Jose Luis Sarmiento, [facility] [place]   [phone])

## 2021-02-10 NOTE — PROGRESS NOTES
JANETH;  CRM notes that patient was admitted last evening from home. She will be transferred to a telemetry unit. This CRM was unable to complete the cm one stop as patient was seen by Cardiologist as her heartrate was elevated and patient. Patient has her signifigant other at the bedside and hew will stay with patient. She is 36 weeks and nursing has explained that she will be moved to [address] [place] [address] and nursing will continue to monitor fetal heartones. Emotional support offered to patient and partner at this time. Patient encouraged to ask questions and Dr Chaparrita Chaney met with this couple. I will make my team member Moe Khoury aware of this transfer.

## 2021-02-10 NOTE — PROGRESS NOTES
TRANSITIONS OF CARE PLAN:   1. DESTINATION: Own Home  2. TRANSPORT: Mother    3. ADDITIONAL SUPPORT: Mother, Boyfriend  4. DME: None    5. HOME HEALTH: TBD  6. CODE STATUS/AMD STATUS: Full Code; not on file  7. FOLLOW UP APPOINTMENTS: PCP, OB-GYN, Cardio  8. STILL NEEDS: FHT, IV Metoprolol, Echo    Reason for Admission:   Vaginal Bleeding in pregnancy, third trimester                   RUR Score:         N/A; OBS - RRAT: 4            Plan for utilizing home health:      TBD    PCP: First and Last name:  [address]   [facility] of Practice: [facility]   Are you a current patient: Yes/No: yes   Approximate date of last visit: 2-3 months   Can you participate in a virtual visit with your PCP: yes                    Current Advanced Directive/Advance Care Plan: Full Code; not on file                         Transition of Care Plan:   CM met with patient, with patient alert and oriented x4. Patient was a rapid response 2/10 for elevated HR and transferred to 3N. Patient lives with her mother in a 2nd floor apartment, 14 exterior steps. Patient is independent in ADLs, to include driving. Patient is now 36w0d in her pregnancy. Patient has no hx of HH or Rehab; pharmacy preference [facility] on [address] Likely disposition is for discharge to own home with transport via family. Previous CM provided patient with OBS notice. CM notes that patient does have INS and submitted proof to Patient Admitting at confirmed fax F: [phone]. Copy is on hard chart. Care Management Interventions  PCP Verified by CM: Yes(last seen by JOSH Galvez 2-3 months ago)  Palliative Care Criteria Met (RRAT>21 & CHF Dx)?: No  Mode of Transport at Discharge:  Other (see comment)(mother)  Transition of Care Consult (CM Consult): Discharge Planning  MyChart Signup: Yes  Discharge Durable Medical Equipment: No(none)  Health Maintenance Reviewed: Yes(cm met with patient, with patient alert and oriented x4)  Physical Therapy Consult: No  Occupational Therapy Consult: No  Speech Therapy Consult: No  Current Support Network: Own Home, Family Lives Nearby  Confirm Follow Up Transport: Self(independent in adls, to include driving)  [address] Provided?: No  Discharge Location  Discharge Placement: Home with family assistance(lives with mother in a 2nd floor apartment, 14 exterior steps)  CRM: Joycie Schlatter, MPH, [address] [place] [address]; Z: [phone]

## 2021-02-10 NOTE — CONSULTS
[address]    Name:  Judith Castro  MR#:  [id number]  :  1995  ACCOUNT #:  [de-identified]  DATE OF SERVICE:  02/10/2021      CARDIOLOGY CONSULTATION    REFERRING PHYSICIAN:  Maddie Gutierrez MD    HISTORY OF PRESENT ILLNESS:  A consult was called on this patient who is in the [facility] suite and was noted to have episodic very fast heart rates in the 160-170 range, abrupt onset, abrupt ending. The patient has palpitations and is anxious about her tachycardia. She is here with vaginal bleeding during her pregnancy. Delivery date is 03/10/2021. The patient has a history of episodic tachycardia episodes but typically not as intense as the episode she has had today. She has never been seen by a specialist for this and is not taking any medication. She is otherwise healthy. She is a  2, did not carry her first pregnancy to term. She indicates this pregnancy is otherwise going well. She denies any chest pain, dyspnea, syncope, dizziness, tremors, edema, or claudication. She does have some anxiety and palpitations. PHYSICAL EXAMINATION:  GENERAL:  This is a well-developed, healthy, pleasant, anxious young woman. VITAL SIGNS:  As follow, blood pressure is 129/79, currently heart rate is 110, respirations 14, sats 100% on room air. HEENT:  Unremarkable. CHEST WALL:  Unremarkable. LUNGS:  Clear. HEART:  Sounds are normal.  ABDOMEN:  Gravid. EXTREMITIES:  No significant edema. LABORATORY DATA:  Labs demonstrated normal chemistries including a normal potassium of 3.8, normal TSH. The magnesium is pending. Her 12-lead EKG done after her tachycardia spell was over demonstrates a sinus rhythm with a narrow QRS complex and a short MT interval.  The fetal monitor does demonstrate periods where the patient's heart rate increases to 160-170, but this does not provide tracing. IMPRESSION:  1.   The patient's presentation of tachycardia is strongly suggestive of paroxysmal supraventricular tachycardia. In addition, the patient has a short ID interval, which predisposes to this particular tachycardia. 2.  She is hemodynamically stable and no signs or symptoms of heart failure or acute coronary syndrome. RECOMMENDATIONS:  Transferred to telemetry. Discussed with Dr. Ryan Benjamin. Plan: IV metoprolol as needed. An echocardiogram has been ordered. I discussed the presumptive diagnosis with the patient and her significant other indicating that the patient was otherwise healthy, her cardiac exam was normal.  We will get an echocardiogram to assess structural and functional aspects of her heart, and I will expect the metoprolol to control her tachycardia. Further recommendations to follow.     Thank you for this referral.      Edwin Ng MD      [id number]  D:  02/10/2021 12:03  T:  02/10/2021 13:59  JOB #:  [id number]

## 2021-02-10 NOTE — PROGRESS NOTES
4807- Rapid response called, patients , patient is on fetal heart monitor.     1011- MD at bedside, patients HR still elevated, EKG being complete    1020- Consult to Cardiology    1030- Labs ordered and drawn

## 2021-02-10 NOTE — PROGRESS NOTES
2642: Bedside and Verbal shift change report given to Naomi Ferguson RN (oncoming nurse) by Claudene Seed RN (offgoing nurse). Report included the following information SBAR, Kardex, Intake/Output, MAR and Recent Results. 9108: Fetal monitoring applied for NST.   1882: pt heart rate 175. Pt \"does not feel right\" and has some shortness of breath. MD notified, Rapid response called. 1011: MD at bedside, heart rate still elevated with periods of SVT. EKG done at bedside. 1020: Stat cardio consult called. 1030: Labs ordered and drawn  1045: Cardiology at bedside. Orders for lab ad-ons and an echo. Pt to go to tele bed. 1054: transfer order in    1110: TRANSFER - OUT REPORT:  Verbal report given to Jay Bills RN(name) on Mima Skiff  being transferred to [address] [place] [address](unit) for change in patient condition(SVT)     Report consisted of patients Situation, Background, Assessment and   Recommendations(SBAR). Information from the following report(s) SBAR, Kardex, Intake/Output, MAR and Recent Results was reviewed with the receiving nurse. Lines:   Peripheral IV 02/09/21 Left Antecubital (Active)   Site Assessment Clean, dry, & intact 02/10/21 1120   Phlebitis Assessment 0 02/10/21 1120   Infiltration Assessment 0 02/10/21 1120   Dressing Status Clean, dry, & intact 02/10/21 1120   Dressing Type Transparent;Tape 02/10/21 1120   Hub Color/Line Status Pink;Capped 02/10/21 1120   Action Taken Open ports on tubing capped 02/10/21 0027   Alcohol Cap Used Yes 02/10/21 1120     Opportunity for questions and clarification was provided. Patient transported with:   Monitor    1120: MD aware of transfer. 1131: L&D called and notified of pt transfer.

## 2021-02-10 NOTE — PROGRESS NOTES
Bedside and Verbal shift change report given to ÓSCAR Kendall RN (oncoming nurse) by Rubi Gilliland RN (offgoing nurse). Report included the following information SBAR, Kardex, Intake/Output, MAR, Accordion and Recent Results.

## 2021-02-11 ENCOUNTER — APPOINTMENT (OUTPATIENT)
Dept: CT IMAGING | Age: 26
DRG: 832 | End: 2021-02-11
Attending: FAMILY MEDICINE
Payer: COMMERCIAL

## 2021-02-11 ENCOUNTER — APPOINTMENT (OUTPATIENT)
Dept: VASCULAR SURGERY | Age: 26
DRG: 832 | End: 2021-02-11
Attending: FAMILY MEDICINE
Payer: COMMERCIAL

## 2021-02-11 PROBLEM — R06.00 DYSPNEA: Status: ACTIVE | Noted: 2021-02-11

## 2021-02-11 PROBLEM — Z3A.36 36 WEEKS GESTATION OF PREGNANCY: Status: ACTIVE | Noted: 2021-02-11

## 2021-02-11 PROBLEM — I47.1 SVT (SUPRAVENTRICULAR TACHYCARDIA) (HCC): Status: ACTIVE | Noted: 2021-02-11

## 2021-02-11 LAB
ALBUMIN SERPL-MCNC: 2.9 G/DL (ref 3.5–5)
ALBUMIN/GLOB SERPL: 0.7 {RATIO} (ref 1.1–2.2)
ALP SERPL-CCNC: 110 U/L (ref 45–117)
ALT SERPL-CCNC: 40 U/L (ref 12–78)
ALT SERPL-CCNC: 42 U/L (ref 12–78)
ANION GAP SERPL CALC-SCNC: 11 MMOL/L (ref 5–15)
APPEARANCE UR: CLEAR
APTT PPP: 24.7 SEC (ref 22.1–31)
APTT PPP: 36.8 SEC (ref 22.1–31)
AST SERPL-CCNC: 24 U/L (ref 15–37)
BACTERIA URNS QL MICRO: ABNORMAL /HPF
BASOPHILS # BLD: 0 K/UL (ref 0–0.1)
BASOPHILS NFR BLD: 0 % (ref 0–1)
BILIRUB SERPL-MCNC: 0.2 MG/DL (ref 0.2–1)
BILIRUB UR QL: NEGATIVE
BUN SERPL-MCNC: 7 MG/DL (ref 6–20)
BUN/CREAT SERPL: 8 (ref 12–20)
CALCIUM SERPL-MCNC: 9.6 MG/DL (ref 8.5–10.1)
CHLORIDE SERPL-SCNC: 107 MMOL/L (ref 97–108)
CO2 SERPL-SCNC: 18 MMOL/L (ref 21–32)
COLOR UR: ABNORMAL
CREAT SERPL-MCNC: 0.86 MG/DL (ref 0.55–1.02)
CREAT UR-MCNC: 57.6 MG/DL
DIFFERENTIAL METHOD BLD: ABNORMAL
EOSINOPHIL # BLD: 0 K/UL (ref 0–0.4)
EOSINOPHIL NFR BLD: 0 % (ref 0–7)
EPITH CASTS URNS QL MICRO: ABNORMAL /LPF
ERYTHROCYTE [DISTWIDTH] IN BLOOD BY AUTOMATED COUNT: 13.5 % (ref 11.5–14.5)
GLOBULIN SER CALC-MCNC: 4.3 G/DL (ref 2–4)
GLUCOSE BLD STRIP.AUTO-MCNC: 129 MG/DL (ref 65–100)
GLUCOSE SERPL-MCNC: 130 MG/DL (ref 65–100)
GLUCOSE UR STRIP.AUTO-MCNC: NEGATIVE MG/DL
HCT VFR BLD AUTO: 33.3 % (ref 35–47)
HGB BLD-MCNC: 11.1 G/DL (ref 11.5–16)
HGB UR QL STRIP: NEGATIVE
IMM GRANULOCYTES # BLD AUTO: 0.2 K/UL (ref 0–0.04)
IMM GRANULOCYTES NFR BLD AUTO: 1 % (ref 0–0.5)
KETONES UR QL STRIP.AUTO: 15 MG/DL
LACTATE SERPL-SCNC: 2.9 MMOL/L (ref 0.4–2)
LEUKOCYTE ESTERASE UR QL STRIP.AUTO: ABNORMAL
LYMPHOCYTES # BLD: 1 K/UL (ref 0.8–3.5)
LYMPHOCYTES NFR BLD: 7 % (ref 12–49)
MCH RBC QN AUTO: 30.3 PG (ref 26–34)
MCHC RBC AUTO-ENTMCNC: 33.3 G/DL (ref 30–36.5)
MCV RBC AUTO: 91 FL (ref 80–99)
MONOCYTES # BLD: 0.5 K/UL (ref 0–1)
MONOCYTES NFR BLD: 4 % (ref 5–13)
NEUTS SEG # BLD: 13.2 K/UL (ref 1.8–8)
NEUTS SEG NFR BLD: 88 % (ref 32–75)
NITRITE UR QL STRIP.AUTO: NEGATIVE
NRBC # BLD: 0.02 K/UL (ref 0–0.01)
NRBC BLD-RTO: 0.1 PER 100 WBC
PH UR STRIP: 7 [PH] (ref 5–8)
PLATELET # BLD AUTO: 226 K/UL (ref 150–400)
PMV BLD AUTO: 11.7 FL (ref 8.9–12.9)
POTASSIUM SERPL-SCNC: 3.8 MMOL/L (ref 3.5–5.1)
PROT SERPL-MCNC: 7.2 G/DL (ref 6.4–8.2)
PROT UR STRIP-MCNC: NEGATIVE MG/DL
PROT UR-MCNC: 7 MG/DL (ref 0–11.9)
PROT/CREAT UR-RTO: 0.1
RBC # BLD AUTO: 3.66 M/UL (ref 3.8–5.2)
RBC #/AREA URNS HPF: ABNORMAL /HPF (ref 0–5)
SERVICE CMNT-IMP: ABNORMAL
SODIUM SERPL-SCNC: 136 MMOL/L (ref 136–145)
SP GR UR REFRACTOMETRY: 1.01 (ref 1–1.03)
THERAPEUTIC RANGE,PTTT: ABNORMAL SECS (ref 58–77)
THERAPEUTIC RANGE,PTTT: NORMAL SECS (ref 58–77)
TROPONIN I SERPL-MCNC: <0.05 NG/ML
UA: UC IF INDICATED,UAUC: ABNORMAL
UROBILINOGEN UR QL STRIP.AUTO: 0.2 EU/DL (ref 0.2–1)
WBC # BLD AUTO: 15 K/UL (ref 3.6–11)
WBC URNS QL MICRO: ABNORMAL /HPF (ref 0–4)

## 2021-02-11 PROCEDURE — 74011000636 HC RX REV CODE- 636: Performed by: OBSTETRICS & GYNECOLOGY

## 2021-02-11 PROCEDURE — 84460 ALANINE AMINO (ALT) (SGPT): CPT

## 2021-02-11 PROCEDURE — 87040 BLOOD CULTURE FOR BACTERIA: CPT

## 2021-02-11 PROCEDURE — 74011250637 HC RX REV CODE- 250/637: Performed by: FAMILY MEDICINE

## 2021-02-11 PROCEDURE — 85730 THROMBOPLASTIN TIME PARTIAL: CPT

## 2021-02-11 PROCEDURE — 93005 ELECTROCARDIOGRAM TRACING: CPT

## 2021-02-11 PROCEDURE — 74011250637 HC RX REV CODE- 250/637: Performed by: OBSTETRICS & GYNECOLOGY

## 2021-02-11 PROCEDURE — 77030038269 HC DRN EXT URIN PURWCK BARD -A

## 2021-02-11 PROCEDURE — 65660000000 HC RM CCU STEPDOWN

## 2021-02-11 PROCEDURE — 99218 HC RM OBSERVATION: CPT

## 2021-02-11 PROCEDURE — 81001 URINALYSIS AUTO W/SCOPE: CPT

## 2021-02-11 PROCEDURE — 93971 EXTREMITY STUDY: CPT

## 2021-02-11 PROCEDURE — 80053 COMPREHEN METABOLIC PANEL: CPT

## 2021-02-11 PROCEDURE — 83605 ASSAY OF LACTIC ACID: CPT

## 2021-02-11 PROCEDURE — 82962 GLUCOSE BLOOD TEST: CPT

## 2021-02-11 PROCEDURE — 84484 ASSAY OF TROPONIN QUANT: CPT

## 2021-02-11 PROCEDURE — 74011250636 HC RX REV CODE- 250/636: Performed by: FAMILY MEDICINE

## 2021-02-11 PROCEDURE — 81050 URINALYSIS VOLUME MEASURE: CPT

## 2021-02-11 PROCEDURE — 96365 THER/PROPH/DIAG IV INF INIT: CPT

## 2021-02-11 PROCEDURE — 74011250636 HC RX REV CODE- 250/636: Performed by: OBSTETRICS & GYNECOLOGY

## 2021-02-11 PROCEDURE — 74011250637 HC RX REV CODE- 250/637: Performed by: INTERNAL MEDICINE

## 2021-02-11 PROCEDURE — 71275 CT ANGIOGRAPHY CHEST: CPT

## 2021-02-11 PROCEDURE — 85025 COMPLETE CBC W/AUTO DIFF WBC: CPT

## 2021-02-11 PROCEDURE — 96366 THER/PROPH/DIAG IV INF ADDON: CPT

## 2021-02-11 PROCEDURE — 36415 COLL VENOUS BLD VENIPUNCTURE: CPT

## 2021-02-11 RX ORDER — HEPARIN SODIUM 10000 [USP'U]/100ML
15-36 INJECTION, SOLUTION INTRAVENOUS
Status: DISCONTINUED | OUTPATIENT
Start: 2021-02-11 | End: 2021-02-11

## 2021-02-11 RX ORDER — METOPROLOL TARTRATE 25 MG/1
25 TABLET, FILM COATED ORAL
Status: DISCONTINUED | OUTPATIENT
Start: 2021-02-11 | End: 2021-02-13 | Stop reason: HOSPADM

## 2021-02-11 RX ORDER — HEPARIN SODIUM 5000 [USP'U]/ML
80 INJECTION, SOLUTION INTRAVENOUS; SUBCUTANEOUS ONCE
Status: COMPLETED | OUTPATIENT
Start: 2021-02-11 | End: 2021-02-11

## 2021-02-11 RX ORDER — BUSPIRONE HYDROCHLORIDE 5 MG/1
5 TABLET ORAL ONCE
Status: COMPLETED | OUTPATIENT
Start: 2021-02-11 | End: 2021-02-11

## 2021-02-11 RX ORDER — ALPRAZOLAM 0.25 MG/1
0.25 TABLET ORAL
Status: DISCONTINUED | OUTPATIENT
Start: 2021-02-11 | End: 2021-02-13 | Stop reason: HOSPADM

## 2021-02-11 RX ADMIN — BUSPIRONE HYDROCHLORIDE 5 MG: 5 TABLET ORAL at 04:14

## 2021-02-11 RX ADMIN — METOPROLOL TARTRATE 25 MG: 25 TABLET, FILM COATED ORAL at 09:18

## 2021-02-11 RX ADMIN — Medication 10 ML: at 21:50

## 2021-02-11 RX ADMIN — METOPROLOL TARTRATE 25 MG: 25 TABLET, FILM COATED ORAL at 18:31

## 2021-02-11 RX ADMIN — HEPARIN SODIUM 7850 UNITS: 5000 INJECTION INTRAVENOUS; SUBCUTANEOUS at 13:48

## 2021-02-11 RX ADMIN — HEPARIN SODIUM 15 UNITS/KG/HR: 10000 INJECTION, SOLUTION INTRAVENOUS at 04:27

## 2021-02-11 RX ADMIN — Medication 1 TABLET: at 09:19

## 2021-02-11 RX ADMIN — Medication 10 ML: at 13:53

## 2021-02-11 RX ADMIN — METOPROLOL TARTRATE 25 MG: 25 TABLET, FILM COATED ORAL at 13:48

## 2021-02-11 RX ADMIN — ALPRAZOLAM 0.25 MG: 0.25 TABLET ORAL at 09:19

## 2021-02-11 RX ADMIN — ALPRAZOLAM 0.25 MG: 0.25 TABLET ORAL at 21:50

## 2021-02-11 RX ADMIN — IOPAMIDOL 70 ML: 755 INJECTION, SOLUTION INTRAVENOUS at 12:48

## 2021-02-11 NOTE — PROGRESS NOTES
Nurse Notes    OB----Dr. Jaeger Vona called due to patient 's. MD made aware, hospitalist order to consult. Consultation placed. Communication send to hospitalist 0102 concerning new patient consult. Order placed of duplex of right leg    EKG completed per protocol       0216 Rapid Response called. Patient -168 and sustaining, complaining of chest pain and sob. Nasal cannula 2 liters applied to patient. Patient oxygen saturation at 100%. Mother of patient at bedside. NP and MD (Dr. Portillo Thompson) at bedside. Patient HR between 110's -140's. On-call OB called to bedside for assessment. Labs drawn. On assessment patient started on heparin drip. Patient requesting medications to assist with anxiety. MD consulted medication ordered see MAR. EKG ordered and completed    2/11/21 0645 patient 's this RN at bedside. Hospitalist consulted. CT and lab work ordered. 0745 Shift report given to dayshift nurse.

## 2021-02-11 NOTE — PROGRESS NOTES
MFM Note    I stopped to see the patient and follow-up on consult by Dr. Renate Lorenzo last night (see note from yesterday). Ms. Luz Elena Thacker reports overnight still having episodes of tachycardia and feeling like she can't breath. Today she says that she is felling somewhat better. Thinks the anxiety med (xanax 0.25mg) helped enough so that she could sleep overnight. She also thinks that the metoprolol 25tid may be helping to improve heart pounding/racing. She has no OB complaints- no VB, LOF, contraction. VS: She has had 2-3 mild range BPs this admit, which seem to occur mostly with episodes of tachycardia. Pulse is typically normal but then occasionally over several seconds increases to 130-180. PE: nad, nl-wob, sitting in bed. Becomes tearful at times when discussing events. Labs: Upr/cr 0.1, WBC 15, Hgb 11.1, Plt 226, crea 0.86, ast 24. NST: base 140, mod keli, +accel, no decel, occasional ctx, notable intermittent maternal tachycardia on Pox to 160-180 up from a normal heart rate. CUS Duplex BLE (swelling over night so VTE was suspected): negative. PECT: negative. ASSESSMENT/PLAN:    27yo  at 36w1 admitted at first with vaginal bleeding that stopped, then evaluated for intermittent tachycardia consistent with SVT with concomitant mild-range hypertension. Currently on telemetry. Metoprolol starting per cardiology. Unclear if the patient's anxiety is playing a role. Considering the vaginal bleeding at admission, and while I think that the HTN is most likely related to the underlying cardiac-related issues rather than gestational hypertension, I believe that gestational hypertension is high enough on the differential that delivery is reasonable after 37w0 is reached in the absence of other earlier obstetric indications. No indication for delivery or steroids for fetal lung maturity at present.    Hopefully, the cardiologists can medically optimize the patient prior to planned delivery. I discussed the case with Dr. Sade Cruz today. Please contact me anytime if I can provide more guidance with Ms. Donis's care. I spent 30 min on this inpatient follow up and >50% is in face to face counseling.      Debra Mathias MD  Maternal Fetal Medicine

## 2021-02-11 NOTE — PROGRESS NOTES
High Risk Obstetrics Progress Note    Name: Nirali Chatterjee      MRN: [id number]  SSN: [id number]    YOB: 1995  Age: 23 y.o. Sex: female      Subjective:      LOS: 0 days    Estimated Date of Delivery: 3/10/21   Gestational Age Today: 43w3d     Patient admitted for vaginal bleeding. Initially a few small clots and stained underwear, now only brown discharge. No current vagina bleeding. States she does have mild contractions and normal fetal movement. SHe had cardiology consult for SVT earlier today and was placed on  Metoprolol by Cardiology. Medicine was consulted tonight for chest pain, and Lower extremity edema noted along with SVT. ,pairient is apprehensive and appears mildly SOB although Pulse ox is @ 100%. Objective:     Vitals:  Blood pressure 139/83, pulse 97, temperature 98.8 °F (37.1 °C), resp. rate 16, height 5' 8\" (1.727 m), weight 98 kg (216 lb 0.8 oz), last menstrual period 2020, SpO2 99 %, not currently breastfeeding. Temp (24hrs), Av.8 °F (37.1 °C), Min:97.3 °F (36.3 °C), Max:99.7 °F (98.9 °C)    Systolic (37KVQ), XSE:850 , Min:108 , ZEO:733      Diastolic (67TCL), HJQ:97, Min:61, Max:89       Intake and Output:         Physical Exam:  Patient with distress.   Fundus: soft, firm and soft then firms intermitently, NTTP  Cervical Exam: Deferred  Lower Extremities: right calf TTP and appears larger than left       Membranes:  Intact    Uterine Activity:  None    Fetal Heart Rate:  Baseline: 130 per minute  Variability: moderate  Accelerations: no  Decelerations: none  Uterine contractions: none        Labs:   Recent Results (from the past 36 hour(s))   CBC W/O DIFF    Collection Time: 21  6:32 PM   Result Value Ref Range    WBC 10.1 3.6 - 11.0 K/uL    RBC 3.57 (L) 3.80 - 5.20 M/uL    HGB 11.0 (L) 11.5 - 16.0 g/dL    HCT 33.1 (L) 35.0 - 47.0 %    MCV 92.7 80.0 - 99.0 FL    MCH 30.8 26.0 - 34.0 PG    MCHC 33.2 30.0 - 36.5 g/dL    RDW 13.2 11.5 - 14.5 %    PLATELET 541 491 - 400 K/uL    MPV 11.8 8.9 - 12.9 FL    NRBC 0.0 0  WBC    ABSOLUTE NRBC 0.00 0.00 - 0.01 K/uL   EKG, 12 LEAD, INITIAL    Collection Time: 02/10/21 10:15 AM   Result Value Ref Range    Ventricular Rate 99 BPM    Atrial Rate 99 BPM    P-R Interval 108 ms    QRS Duration 70 ms    Q-T Interval 326 ms    QTC Calculation (Bezet) 418 ms    Calculated P Axis 71 degrees    Calculated R Axis 76 degrees    Calculated T Axis 42 degrees    Diagnosis       Sinus rhythm with marked sinus arrhythmia with short [place]  Nonspecific ST abnormality  Abnormal ECG  When compared with ECG of 17-NOV-2016 16:33,  Nonspecific T wave abnormality no longer evident in Anterior leads  QT has shortened  Confirmed by Jenny Amor MD, [facility] ([id number]) on 2/10/2021 2:16:59 PM     TROPONIN I    Collection Time: 02/10/21 10:30 AM   Result Value Ref Range    Troponin-I, Qt. <0.05 <0.05 ng/mL   SAMPLES BEING HELD    Collection Time: 02/10/21 10:30 AM   Result Value Ref Range    SAMPLES BEING HELD 1BLU,1PST     COMMENT        Add-on orders for these samples will be processed based on acceptable specimen integrity and analyte stability, which may vary by analyte. CBC WITH AUTOMATED DIFF    Collection Time: 02/10/21 10:33 AM   Result Value Ref Range    WBC 16.5 (H) 3.6 - 11.0 K/uL    RBC 3.95 3.80 - 5.20 M/uL    HGB 12.0 11.5 - 16.0 g/dL    HCT 36.0 35.0 - 47.0 %    MCV 91.1 80.0 - 99.0 FL    MCH 30.4 26.0 - 34.0 PG    MCHC 33.3 30.0 - 36.5 g/dL    RDW 13.2 11.5 - 14.5 %    PLATELET 857 109 - 247 K/uL    MPV 11.7 8.9 - 12.9 FL    NRBC 0.1 (H) 0  WBC    ABSOLUTE NRBC 0.02 (H) 0.00 - 0.01 K/uL    NEUTROPHILS 80 (H) 32 - 75 %    LYMPHOCYTES 13 12 - 49 %    MONOCYTES 6 5 - 13 %    EOSINOPHILS 0 0 - 7 %    BASOPHILS 0 0 - 1 %    IMMATURE GRANULOCYTES 1 (H) 0.0 - 0.5 %    ABS. NEUTROPHILS 13.2 (H) 1.8 - 8.0 K/UL    ABS. LYMPHOCYTES 2.1 0.8 - 3.5 K/UL    ABS. MONOCYTES 1.0 0.0 - 1.0 K/UL    ABS. EOSINOPHILS 0.0 0.0 - 0.4 K/UL    ABS.  BASOPHILS 0.0 0.0 - 0.1 K/UL    ABS. IMM. GRANS. 0.2 (H) 0.00 - 0.04 K/UL    DF AUTOMATED     METABOLIC PANEL, COMPREHENSIVE    Collection Time: 02/10/21 10:33 AM   Result Value Ref Range    Sodium 136 136 - 145 mmol/L    Potassium 3.8 3.5 - 5.1 mmol/L    Chloride 107 97 - 108 mmol/L    CO2 18 (L) 21 - 32 mmol/L    Anion gap 11 5 - 15 mmol/L    Glucose 145 (H) 65 - 100 mg/dL    BUN 8 6 - 20 MG/DL    Creatinine 0.89 0.55 - 1.02 MG/DL    BUN/Creatinine ratio 9 (L) 12 - 20      GFR est AA >60 >60 ml/min/1.73m2    GFR est non-AA >60 >60 ml/min/1.73m2    Calcium 9.7 8.5 - 10.1 MG/DL    Bilirubin, total 0.2 0.2 - 1.0 MG/DL    ALT (SGPT) 39 12 - 78 U/L    AST (SGOT) 18 15 - 37 U/L    Alk.  phosphatase 117 45 - 117 U/L    Protein, total 7.4 6.4 - 8.2 g/dL    Albumin 3.0 (L) 3.5 - 5.0 g/dL    Globulin 4.4 (H) 2.0 - 4.0 g/dL    A-G Ratio 0.7 (L) 1.1 - 2.2     MAGNESIUM    Collection Time: 02/10/21 10:33 AM   Result Value Ref Range    Magnesium 1.8 1.6 - 2.4 mg/dL   TSH 3RD GENERATION    Collection Time: 02/10/21 10:33 AM   Result Value Ref Range    TSH 2.60 0.36 - 3.74 uIU/mL   ECHO ADULT COMPLETE    Collection Time: 02/10/21  4:30 PM   Result Value Ref Range    IVSd 0.87 0.6 - 0.9 cm    LVIDd 4.46 3.9 - 5.3 cm    LVIDs 2.82 cm    LVPWd 0.91 (A) 0.6 - 0.9 cm    LVOT Peak Gradient 4.07 mmHg    LVOT Peak Velocity 100.92 cm/s    Left Atrium Major Axis 3.76 cm    AoV PG 10.91 mmHg    Aortic Valve Systolic Peak Velocity 604.33 cm/s    MV A Crow 79.73 cm/s    Mitral Valve E Wave Deceleration Time 148.98 ms    MV E Crow 97.08 cm/s    E/E' ratio (averaged) 6.13     E/E' lateral 5.73     E/E' septal 6.52     LV E' Lateral Velocity 16.94 cm/s    LV E' Septal Velocity 14.89 cm/s    Mitral Valve Pressure Half-time 43.20 ms    MVA (PHT) 5.09 cm2    Pulmonic Valve Systolic Peak Instantaneous Gradient 6.78 mmHg    Pulmonic Valve Max Velocity 130.16 cm/s    Tapse 3.45 (A) 1.5 - 2.0 cm    Ao Root D 2.87 cm    MV E/A 1.22     LV Mass .9 67 - 162 g    LV Mass AL Index 61.0 43 - 95 g/m2    Left Atrium Minor Axis 1.78 cm   PROTEIN/CREATININE RATIO, URINE    Collection Time: 02/10/21 10:06 PM   Result Value Ref Range    Protein, urine random 7 0.0 - 11.9 mg/dL    Creatinine, urine 57.60 mg/dL    Protein/Creat. urine Ratio 0.1     SAMPLES BEING HELD    Collection Time: 02/10/21 10:06 PM   Result Value Ref Range    SAMPLES BEING HELD 1 ua cup      COMMENT        Add-on orders for these samples will be processed based on acceptable specimen integrity and analyte stability, which may vary by analyte. URINE CULTURE HOLD SAMPLE    Collection Time: 02/10/21 10:06 PM    Specimen: Serum   Result Value Ref Range    Urine culture hold        Urine on hold in Microbiology dept for 2 days. If unpreserved urine is submitted, it cannot be used for addtional testing after 24 hours, recollection will be required. Assessment and Plan: Active Problems:    Vaginal bleeding in pregnancy, third trimester (2/9/2021)       IUP @ 39 w 1 d    Admitted with vaginal bleeding- none currently,no clinical evidence of abruption  Mildly elevated BP but not in severe range- no headache, scotomata or RUQ pain c/w Pre-e- consider PIH labs. LE edema asymmetrical with right calf tendernes and SVT, SOB c/w Possible Pulmonary Embolus medicine is seeing patient and is considering Ct. Dopplers, but empiric Treatment of Heparin is being Started given her current clinical findings suggesting PE.  NSt- ordered   Continue close observation for PTL, Abruption , Chorioamnionitis other Pregnancy related issues, agree with Heparin given clinical Scenario of PE. But will defer management to Medicine Practitioner (heparin and Lovenox safe in Pregnancy(heparine may be better in case she needs to be delivered. Thanks for Medicine Support.     Signed By: Debi Latham MD     February 11, 2021

## 2021-02-11 NOTE — PROGRESS NOTES
Cardiology Progress Note  2021     Admit Date: 2021  Admit Diagnosis: Vaginal bleeding in pregnancy, third trimester [O46.93]  CC: none currently    Assessment/Plan:   Events noted. Had breakthrough SVT several times last night/early this morning. On going chest pain. On heparin. Echo done yesterday was normal.  Plans for chest CT noted to assess for PE. This will also assess her aorta to look for a dissection. Will increase her scheduled metoprolol dose. Discussed with the patient and her mother. For other plans, see orders.   Subjective: Fadumo Louder reports   Chest Pain:  [x]  none;  consistent with []  non-cardiac  []  atypical  []  angina             [x]  none now    []  on-going  Dyspnea: [x]  none    []  at rest    []  with exertion   []  improved    []  unchanged    []  worsening  PND:       [x]  none      []  overnight      Orthopnea:   [x]  none        []  improved         []  unchanged        []  worsening  Presyncope: [x]  none        []  improved         []  unchanged        []  worsening  Ambulated in hallway without symptoms  []  Yes  Ambulated in room without symptoms  []  Yes    Objective:    Physical Exam:  Overall VSSAF;    Visit Vitals  /79 (BP 1 Location: Right upper arm, BP Patient Position: At rest)   Pulse 71   Temp 98.2 °F (36.8 °C)   Resp 16   Ht 172.7 cm (68\")   Wt 98 kg (216 lb 0.8 oz)   LMP 2020 (Approximate)   SpO2 99%   Breastfeeding No   BMI 32.85 kg/m²     Temp (24hrs), Av.7 °F (37.1 °C), Min:97.3 °F (36.3 °C), Max:99.7 °F (37.6 °C)    Patient Vitals for the past 8 hrs:   Pulse   21 0838 71   21 0752 85    Patient Vitals for the past 8 hrs:   Resp   21 0838 16   21 075 14    Patient Vitals for the past 8 hrs:   BP   21 0838 128/79   21 0752 124/79          Intake/Output Summary (Last 24 hours) at 2021 0858  Last data filed at 2/10/2021 1604  Gross per 24 hour   Intake 240 ml   Output --   Net 240 ml General Appearance: Well developed, well nourished, no acute distress. Ears/Nose/Mouth/Throat:   Normal MM; anicteric. JVP: WNL   Resp:   Lungs clear to auscultation bilaterally. Nl resp effort. Cardiovascular:  RRR, S1, S2 normal, no new murmur. No gallop or rub. Abdomen:   Soft, non-tender, bowel sounds are present. Extremities: No edema bilaterally. Skin:  Neuro: Warm and dry. A/O x3, grossly nonfocal    []  cath site intact w/o hematoma or bruit; distal pulse unchanged. Data Review:     Telemetry independently reviewed : []  sinus      []  chronic afib     []  par afib    []  NSVT    ECG independently reviewed:  []  NSR         []  no significant changes  [] no new ECG provided for review  Lab results reviewed as noted below. Current medications reviewed as noted below. No results for input(s): PH, PCO2, PO2 in the last 72 hours. Recent Labs     02/10/21  1030   TROIQ <0.05     Recent Labs     02/11/21 0448 02/11/21  0340 02/10/21  1033 02/09/21  1832     --  136  --    K 3.8  --  3.8  --      --  107  --    CO2 18*  --  18*  --    BUN 7  --  8  --    CREA 0.86  --  0.89  --    *  --  145*  --    CA 9.6  --  9.7  --    ALB 2.9*  --  3.0*  --    WBC  --  15.0* 16.5* 10.1   HGB  --  11.1* 12.0 11.0*   HCT  --  33.3* 36.0 33.1*   PLT  --  226 233 198     Recent Labs     02/11/21 0448 02/10/21  1033   ALT 42  40 39    117   TBILI 0.2 0.2   TP 7.2 7.4   ALB 2.9* 3.0*   GLOB 4.3* 4.4*     Recent Labs     02/11/21  0340   APTT 24.7      No results for input(s): FE, TIBC, PSAT, FERR in the last 72 hours.    Lab Results   Component Value Date/Time    Glucose (POC) 129 (H) 02/11/2021 06:55 AM       Current Facility-Administered Medications   Medication Dose Route Frequency    heparin 25,000 units in D5W 250 ml infusion  15-36 Units/kg/hr IntraVENous TITRATE    ALPRAZolam (XANAX) tablet 0.25 mg  0.25 mg Oral QID PRN    metoprolol tartrate (LOPRESSOR) tablet 12.5 mg 12.5 mg Oral BID    metoprolol (LOPRESSOR) injection 1.25 mg  1.25 mg IntraVENous Q6H PRN    sodium chloride (NS) flush 5-40 mL  5-40 mL IntraVENous Q8H    sodium chloride (NS) flush 5-40 mL  5-40 mL IntraVENous PRN    acetaminophen (TYLENOL) tablet 650 mg  650 mg Oral Q4H PRN    diphenhydrAMINE (BENADRYL) capsule 25 mg  25 mg Oral QHS PRN    alum-mag hydroxide-simeth (MYLANTA) oral suspension 30 mL  30 mL Oral Q4H PRN    prenatal vit-iron fumarate-fa (PRENATAL PLUS with IRON) tablet 1 Tab  1 Tab Oral DAILY        Choco Stewart MD

## 2021-02-11 NOTE — PROGRESS NOTES
CTA chest reviewed. No PE. Stop heparin gtt. Possible GERD, patient doesn't want to try medication for possible GERD.

## 2021-02-11 NOTE — PROGRESS NOTES
Received SBAR from Unit CM    Registration confirmed that [address]. No OON inpatient benefits unless medically unstable for transfer. Noted patient remains on tele, IV Metoprolol PRN, echocardiogram, with further recommendations to follow per Cardiology. CM will follow tomorrow to see if medically stable for transfer to [facility], noted patient in OBS, question if potential for discharge if medically stable.     Claudia Mosley RN, BSN, [facility]  ED Care Management  [phone]
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
## 2021-02-11 NOTE — PROGRESS NOTES
High Risk Obstetrics Progress Note    Name: Mckenna Friend MRN: [id number]  SSN: [id number]    YOB: 1995  Age: 23 y.o. Sex: female      Subjective:      LOS: 3 days    Estimated Date of Delivery: 3/10/21   Gestational Age Today: 43w3d     Patient admitted for initially vaginal bleeding on Tuesday night-since resolved. Yesterday acute maternal tachycardia and new diagnosis of SVT now on tele-monitoring. States she feels \"overwhelmed. \"  History of anxiety not on meds and this is acutely exacerbated by episodes of SVT and dyspnea. Reports not being able to sleep well either. Pt in bed and mom Cheryle at bedside. Pt reports chronic decreased FM since 28 weeks of pregnancy - reports will only get brief periods of time with busy FM and then hours of minimal FM - usually requires active stimulation for movement. No chest pain now or worsening of extremity swelling. Headache resolved overnight. Received buspar in the middle of the night but denies any change in symptoms. Also according to the patient's mom - they had specifically switched the patient's insurance to facilitate delivery at [facility]. Objective:     Vitals:  Blood pressure 124/79, pulse 85, temperature 98.6 °F (37 °C), resp. rate 14, height 5' 8\" (1.727 m), weight 98 kg (216 lb 0.8 oz), last menstrual period 2020, SpO2 98 %, not currently breastfeeding. Temp (24hrs), Av.8 °F (37.1 °C), Min:97.3 °F (36.3 °C), Max:99.7 °F (38.6 °C)    Systolic (24AAJ), GOR:639 , Min:122 , HUT:055      Diastolic (31BJB), MIQ:94, Min:61, Max:89       Intake and Output:         Physical Exam:  Patient without distress. Tearful.   On O2 with NC  Heart: Regular rate and rhythm  Lung: clear to auscultation throughout lung fields, no wheezes, no rales, no rhonchi and normal respiratory effort  Abdomen: soft, nontender  Fundus: soft and non tender  Lower Extremities:  - Edema No       Membranes:  Intact    Uterine Activity:  None    Fetal Heart Rate:  For NST now        Labs:   Recent Results (from the past 36 hour(s))   EKG, 12 LEAD, INITIAL    Collection Time: 02/10/21 10:15 AM   Result Value Ref Range    Ventricular Rate 99 BPM    Atrial Rate 99 BPM    P-R Interval 108 ms    QRS Duration 70 ms    Q-T Interval 326 ms    QTC Calculation (Bezet) 418 ms    Calculated P Axis 71 degrees    Calculated R Axis 76 degrees    Calculated T Axis 42 degrees    Diagnosis       Sinus rhythm with marked sinus arrhythmia with short MS  Nonspecific ST abnormality  Abnormal ECG  When compared with ECG of 17-NOV-2016 16:33,  Nonspecific T wave abnormality no longer evident in Anterior leads  QT has shortened  Confirmed by Rober Tillman MD, Mae Lee ([id number]) on 2/10/2021 2:16:59 PM     TROPONIN I    Collection Time: 02/10/21 10:30 AM   Result Value Ref Range    Troponin-I, Qt. <0.05 <0.05 ng/mL   SAMPLES BEING HELD    Collection Time: 02/10/21 10:30 AM   Result Value Ref Range    SAMPLES BEING HELD 1BLU,1PST     COMMENT        Add-on orders for these samples will be processed based on acceptable specimen integrity and analyte stability, which may vary by analyte. CBC WITH AUTOMATED DIFF    Collection Time: 02/10/21 10:33 AM   Result Value Ref Range    WBC 16.5 (H) 3.6 - 11.0 K/uL    RBC 3.95 3.80 - 5.20 M/uL    HGB 12.0 11.5 - 16.0 g/dL    HCT 36.0 35.0 - 47.0 %    MCV 91.1 80.0 - 99.0 FL    MCH 30.4 26.0 - 34.0 PG    MCHC 33.3 30.0 - 36.5 g/dL    RDW 13.2 11.5 - 14.5 %    PLATELET 385 771 - 482 K/uL    MPV 11.7 8.9 - 12.9 FL    NRBC 0.1 (H) 0  WBC    ABSOLUTE NRBC 0.02 (H) 0.00 - 0.01 K/uL    NEUTROPHILS 80 (H) 32 - 75 %    LYMPHOCYTES 13 12 - 49 %    MONOCYTES 6 5 - 13 %    EOSINOPHILS 0 0 - 7 %    BASOPHILS 0 0 - 1 %    IMMATURE GRANULOCYTES 1 (H) 0.0 - 0.5 %    ABS. NEUTROPHILS 13.2 (H) 1.8 - 8.0 K/UL    ABS. LYMPHOCYTES 2.1 0.8 - 3.5 K/UL    ABS. MONOCYTES 1.0 0.0 - 1.0 K/UL    ABS. EOSINOPHILS 0.0 0.0 - 0.4 K/UL    ABS. BASOPHILS 0.0 0.0 - 0.1 K/UL    ABS. IMM. GRANS. 0.2 (H) 0.00 - 0.04 K/UL    DF AUTOMATED     METABOLIC PANEL, COMPREHENSIVE    Collection Time: 02/10/21 10:33 AM   Result Value Ref Range    Sodium 136 136 - 145 mmol/L    Potassium 3.8 3.5 - 5.1 mmol/L    Chloride 107 97 - 108 mmol/L    CO2 18 (L) 21 - 32 mmol/L    Anion gap 11 5 - 15 mmol/L    Glucose 145 (H) 65 - 100 mg/dL    BUN 8 6 - 20 MG/DL    Creatinine 0.89 0.55 - 1.02 MG/DL    BUN/Creatinine ratio 9 (L) 12 - 20      GFR est AA >60 >60 ml/min/1.73m2    GFR est non-AA >60 >60 ml/min/1.73m2    Calcium 9.7 8.5 - 10.1 MG/DL    Bilirubin, total 0.2 0.2 - 1.0 MG/DL    ALT (SGPT) 39 12 - 78 U/L    AST (SGOT) 18 15 - 37 U/L    Alk.  phosphatase 117 45 - 117 U/L    Protein, total 7.4 6.4 - 8.2 g/dL    Albumin 3.0 (L) 3.5 - 5.0 g/dL    Globulin 4.4 (H) 2.0 - 4.0 g/dL    A-G Ratio 0.7 (L) 1.1 - 2.2     MAGNESIUM    Collection Time: 02/10/21 10:33 AM   Result Value Ref Range    Magnesium 1.8 1.6 - 2.4 mg/dL   TSH 3RD GENERATION    Collection Time: 02/10/21 10:33 AM   Result Value Ref Range    TSH 2.60 0.36 - 3.74 uIU/mL   ECHO ADULT COMPLETE    Collection Time: 02/10/21  4:30 PM   Result Value Ref Range    IVSd 0.87 0.6 - 0.9 cm    LVIDd 4.46 3.9 - 5.3 cm    LVIDs 2.82 cm    LVPWd 0.91 (A) 0.6 - 0.9 cm    LVOT Peak Gradient 4.07 mmHg    LVOT Peak Velocity 100.92 cm/s    Left Atrium Major Axis 3.76 cm    AoV PG 10.91 mmHg    Aortic Valve Systolic Peak Velocity 788.57 cm/s    MV A Crow 79.73 cm/s    Mitral Valve E Wave Deceleration Time 148.98 ms    MV E Crow 97.08 cm/s    E/E' ratio (averaged) 6.13     E/E' lateral 5.73     E/E' septal 6.52     LV E' Lateral Velocity 16.94 cm/s    LV E' Septal Velocity 14.89 cm/s    Mitral Valve Pressure Half-time 43.20 ms    MVA (PHT) 5.09 cm2    Pulmonic Valve Systolic Peak Instantaneous Gradient 6.78 mmHg    Pulmonic Valve Max Velocity 130.16 cm/s    Tapse 3.45 (A) 1.5 - 2.0 cm    Ao Root D 2.87 cm    MV E/A 1.22     LV Mass .9 67 - 162 g    LV Mass AL Index 61.0 43 - 95 g/m2 Left Atrium Minor Axis 1.78 cm   PROTEIN/CREATININE RATIO, URINE    Collection Time: 02/10/21 10:06 PM   Result Value Ref Range    Protein, urine random 7 0.0 - 11.9 mg/dL    Creatinine, urine 57.60 mg/dL    Protein/Creat. urine Ratio 0.1     SAMPLES BEING HELD    Collection Time: 02/10/21 10:06 PM   Result Value Ref Range    SAMPLES BEING HELD 1 ua cup      COMMENT        Add-on orders for these samples will be processed based on acceptable specimen integrity and analyte stability, which may vary by analyte. URINE CULTURE HOLD SAMPLE    Collection Time: 02/10/21 10:06 PM    Specimen: Serum   Result Value Ref Range    Urine culture hold        Urine on hold in Microbiology dept for 2 days. If unpreserved urine is submitted, it cannot be used for addtional testing after 24 hours, recollection will be required. LACTIC ACID    Collection Time: 02/11/21  3:40 AM   Result Value Ref Range    Lactic acid 2.9 (HH) 0.4 - 2.0 MMOL/L   PTT    Collection Time: 02/11/21  3:40 AM   Result Value Ref Range    aPTT 24.7 22.1 - 31.0 sec    aPTT, therapeutic range     58.0 - 77.0 SECS   CBC WITH AUTOMATED DIFF    Collection Time: 02/11/21  3:40 AM   Result Value Ref Range    WBC 15.0 (H) 3.6 - 11.0 K/uL    RBC 3.66 (L) 3.80 - 5.20 M/uL    HGB 11.1 (L) 11.5 - 16.0 g/dL    HCT 33.3 (L) 35.0 - 47.0 %    MCV 91.0 80.0 - 99.0 FL    MCH 30.3 26.0 - 34.0 PG    MCHC 33.3 30.0 - 36.5 g/dL    RDW 13.5 11.5 - 14.5 %    PLATELET 121 895 - 461 K/uL    MPV 11.7 8.9 - 12.9 FL    NRBC 0.1 (H) 0  WBC    ABSOLUTE NRBC 0.02 (H) 0.00 - 0.01 K/uL    NEUTROPHILS 88 (H) 32 - 75 %    LYMPHOCYTES 7 (L) 12 - 49 %    MONOCYTES 4 (L) 5 - 13 %    EOSINOPHILS 0 0 - 7 %    BASOPHILS 0 0 - 1 %    IMMATURE GRANULOCYTES 1 (H) 0.0 - 0.5 %    ABS. NEUTROPHILS 13.2 (H) 1.8 - 8.0 K/UL    ABS. LYMPHOCYTES 1.0 0.8 - 3.5 K/UL    ABS. MONOCYTES 0.5 0.0 - 1.0 K/UL    ABS. EOSINOPHILS 0.0 0.0 - 0.4 K/UL    ABS. BASOPHILS 0.0 0.0 - 0.1 K/UL    ABS. IMM.  GRANS. 0.2 (H) 0.00 - 0.04 K/UL    DF AUTOMATED     ALT    Collection Time: 21  4:48 AM   Result Value Ref Range    ALT (SGPT) 40 12 - 78 U/L   METABOLIC PANEL, COMPREHENSIVE    Collection Time: 21  4:48 AM   Result Value Ref Range    Sodium 136 136 - 145 mmol/L    Potassium 3.8 3.5 - 5.1 mmol/L    Chloride 107 97 - 108 mmol/L    CO2 18 (L) 21 - 32 mmol/L    Anion gap 11 5 - 15 mmol/L    Glucose 130 (H) 65 - 100 mg/dL    BUN 7 6 - 20 MG/DL    Creatinine 0.86 0.55 - 1.02 MG/DL    BUN/Creatinine ratio 8 (L) 12 - 20      GFR est AA >60 >60 ml/min/1.73m2    GFR est non-AA >60 >60 ml/min/1.73m2    Calcium 9.6 8.5 - 10.1 MG/DL    Bilirubin, total 0.2 0.2 - 1.0 MG/DL    ALT (SGPT) 42 12 - 78 U/L    AST (SGOT) 24 15 - 37 U/L    Alk. phosphatase 110 45 - 117 U/L    Protein, total 7.2 6.4 - 8.2 g/dL    Albumin 2.9 (L) 3.5 - 5.0 g/dL    Globulin 4.3 (H) 2.0 - 4.0 g/dL    A-G Ratio 0.7 (L) 1.1 - 2.2     GLUCOSE, POC    Collection Time: 21  6:55 AM   Result Value Ref Range    Glucose (POC) 129 (H) 65 - 100 mg/dL    Performed by Korey Graham and Plan: Active Problems:    Vaginal bleeding in pregnancy, third trimester (2021)        at 36w1d admitted for 3rd trimester bleeding now with SVT and evaluation for possible PE. Concern patient is starting to demonstrate evidence for superimposed preeclampsia with pre-pregnancy mild CHTN not previously requiring treatment. 1. SVT: continue tele monitoring. Has been started on metoprolol. Maternal ECHO normal.  Dopplers overnight negative but due to high possibility for possible PE recommend CT-PA evaluation this morning. Will remain on heparin drip until certain pt does not have PE. Continue O2 to maintain O2 saturation > 92%. 2. CHTN: has not required anti-hypertensive treatment in the past. Pt now without headache. UPCR 0.1. Cr elevated for pregnancy but platelets/lfts otherwise negative.  If rules in for superimposed preeclampsia would proceed with IOL as per discussion with MFM. 3. 36 weeks of pregnancy: s/p 2 doses of betamethasone. Discussed with pt and her mom indications for delivery now vs at a later date. Discussed IOL vs indications for a primary CS. Discussed pain management including epidural.  4. Vaginal bleeding: now resolved. Suspect partial abruption now resolved. Maintain active T&S. Discussed with pt risk for acute hemorrhage; discussed increased risk for bleeding during IOL/labor. 5. Anxiety: can continue buspar. Recommend use of xanax prn for acute episodes. Discussed this medication with the pt and her mom and that given current hospitalization this would be reasonable to use on a limited basis. 6. For NST today. Plan for MFM evaluation today with Dr Zack Torres. I am the [facility] (cell # [phone]). After hours coverage is with the [facility] Hospitalist (cell # [phone]).     45 minutes spent with pt and her mom this morning

## 2021-02-11 NOTE — PROGRESS NOTES
0308:  RN at bedside to perform NST  0345:  Called Dr Mundo Leonard.   RN reported reassuring fetal surveillance and irregular, mild ctx

## 2021-02-12 LAB
COLLECT DURATION TIME UR: 24 HR
PROT 24H UR-MRATE: 300 MG/24HR
SARS-COV-2, COV2: NORMAL
SPECIMEN VOL ?TM UR: 2500 ML

## 2021-02-12 PROCEDURE — 65660000000 HC RM CCU STEPDOWN

## 2021-02-12 PROCEDURE — 74011250637 HC RX REV CODE- 250/637: Performed by: INTERNAL MEDICINE

## 2021-02-12 PROCEDURE — 59025 FETAL NON-STRESS TEST: CPT

## 2021-02-12 PROCEDURE — 87147 CULTURE TYPE IMMUNOLOGIC: CPT

## 2021-02-12 PROCEDURE — 87081 CULTURE SCREEN ONLY: CPT

## 2021-02-12 PROCEDURE — 74011250637 HC RX REV CODE- 250/637: Performed by: OBSTETRICS & GYNECOLOGY

## 2021-02-12 PROCEDURE — U0003 INFECTIOUS AGENT DETECTION BY NUCLEIC ACID (DNA OR RNA); SEVERE ACUTE RESPIRATORY SYNDROME CORONAVIRUS 2 (SARS-COV-2) (CORONAVIRUS DISEASE [COVID-19]), AMPLIFIED PROBE TECHNIQUE, MAKING USE OF HIGH THROUGHPUT TECHNOLOGIES AS DESCRIBED BY CMS-2020-01-R: HCPCS

## 2021-02-12 RX ORDER — BUPROPION HYDROCHLORIDE 150 MG/1
150 TABLET, EXTENDED RELEASE ORAL DAILY
Status: DISCONTINUED | OUTPATIENT
Start: 2021-02-12 | End: 2021-02-13 | Stop reason: HOSPADM

## 2021-02-12 RX ADMIN — BUPROPION HYDROCHLORIDE 150 MG: 150 TABLET, EXTENDED RELEASE ORAL at 11:12

## 2021-02-12 RX ADMIN — Medication 10 ML: at 22:05

## 2021-02-12 RX ADMIN — Medication 1 TABLET: at 08:41

## 2021-02-12 RX ADMIN — METOPROLOL TARTRATE 25 MG: 25 TABLET, FILM COATED ORAL at 18:03

## 2021-02-12 RX ADMIN — METOPROLOL TARTRATE 25 MG: 25 TABLET, FILM COATED ORAL at 08:41

## 2021-02-12 RX ADMIN — ALPRAZOLAM 0.25 MG: 0.25 TABLET ORAL at 15:30

## 2021-02-12 RX ADMIN — METOPROLOL TARTRATE 25 MG: 25 TABLET, FILM COATED ORAL at 11:12

## 2021-02-12 NOTE — PROGRESS NOTES
Problem: Falls - Risk of  Goal: *Absence of Falls  Description: Document Abhijit Freeze Fall Risk and appropriate interventions in the flowsheet.   Outcome: Progressing Towards Goal  Note: Fall Risk Interventions:  Mobility Interventions: Communicate number of staff needed for ambulation/transfer, Patient to call before getting OOB         Medication Interventions: Teach patient to arise slowly, Evaluate medications/consider consulting pharmacy                   Problem: Discharge Planning  Goal: *Discharge to safe environment  Outcome: Progressing Towards Goal

## 2021-02-12 NOTE — PROGRESS NOTES
Cardiology Progress Note  2021     Admit Date: 2021  Admit Diagnosis: Vaginal bleeding in pregnancy, third trimester [O46.93]  36 weeks gestation of pregnancy [Z3A.36]  SVT (supraventricular tachycardia) (HCC) [I47.1]  Dyspnea [R06.00]  CC: none currently    Assessment/Plan:   Heart rate and episodes of SVT are much improved. Some chest pain and SOB. EKG's and troponin levels remain normal.  The patient offers that she feels much better. Review of monitor shows HR consistently < 100 bpm and no significant SVT  Results of echo and chest CT reviewed with the patient and her mom. I would like her to ambulate in the halls and observe her HR prior to discharge. Continue current metoprolol dose. Tentatively home tomorrow  For other plans, see orders.   Subjective: Jared Kim reports   Chest Pain:  [x]  none;  consistent with []  non-cardiac  []  atypical  []  angina             [x]  none now    []  on-going  Dyspnea: [x]  none    []  at rest    []  with exertion   []  improved    []  unchanged    []  worsening  PND:       [x]  none      []  overnight      Orthopnea:   [x]  none        []  improved         []  unchanged        []  worsening  Presyncope: [x]  none        []  improved         []  unchanged        []  worsening  Ambulated in hallway without symptoms  []  Yes  Ambulated in room without symptoms  []  Yes    Objective:    Physical Exam:  Overall VSSAF;    Visit Vitals  /69 (BP 1 Location: Left upper arm, BP Patient Position: At rest)   Pulse 91   Temp 98.7 °F (37.1 °C)   Resp 14   Ht 172.7 cm (68\")   Wt 92.1 kg (203 lb 0.7 oz)   LMP 2020 (Approximate)   SpO2 99%   Breastfeeding No   BMI 30.87 kg/m²     Temp (24hrs), Av.4 °F (36.9 °C), Min:98.1 °F (36.7 °C), Max:98.8 °F (37.1 °C)    Patient Vitals for the past 8 hrs:   Pulse   21 0839 91   21 0454 79   21 0328 87    Patient Vitals for the past 8 hrs:   Resp   21 0839 14   21 0454 16   21 9453 16    Patient Vitals for the past 8 hrs:   BP   02/12/21 0839 107/69   02/12/21 0454 123/76          Intake/Output Summary (Last 24 hours) at 2/12/2021 1048  Last data filed at 2/12/2021 0839  Gross per 24 hour   Intake --   Output 650 ml   Net -650 ml       General Appearance: Well developed, well nourished, no acute distress. Ears/Nose/Mouth/Throat:   Normal MM; anicteric. JVP: WNL   Resp:   Lungs clear to auscultation bilaterally. Nl resp effort. Cardiovascular:  RRR, S1, S2 normal, no new murmur. No gallop or rub. Abdomen:   Soft, non-tender, bowel sounds are present. Extremities: No edema bilaterally. Skin:  Neuro: Warm and dry. A/O x3, grossly nonfocal    []  cath site intact w/o hematoma or bruit; distal pulse unchanged. Data Review:     Telemetry independently reviewed : []  sinus      []  chronic afib     []  par afib    []  NSVT    ECG independently reviewed:  []  NSR         []  no significant changes  [] no new ECG provided for review  Lab results reviewed as noted below. Current medications reviewed as noted below. No results for input(s): PH, PCO2, PO2 in the last 72 hours. Recent Labs     02/11/21  0448 02/10/21  1030   TROIQ <0.05 <0.05     Recent Labs     02/11/21 0448 02/11/21  0340 02/10/21  1033 02/09/21  1832     --  136  --    K 3.8  --  3.8  --      --  107  --    CO2 18*  --  18*  --    BUN 7  --  8  --    CREA 0.86  --  0.89  --    *  --  145*  --    CA 9.6  --  9.7  --    ALB 2.9*  --  3.0*  --    WBC  --  15.0* 16.5* 10.1   HGB  --  11.1* 12.0 11.0*   HCT  --  33.3* 36.0 33.1*   PLT  --  226 233 198     Recent Labs     02/11/21  0448 02/10/21  1033   ALT 42  40 39    117   TBILI 0.2 0.2   TP 7.2 7.4   ALB 2.9* 3.0*   GLOB 4.3* 4.4*     Recent Labs     02/11/21  1116 02/11/21  0340   APTT 36.8* 24.7      No results for input(s): FE, TIBC, PSAT, FERR in the last 72 hours.    Lab Results   Component Value Date/Time    Glucose (POC) 129 (H) 02/11/2021 06:55 AM       Current Facility-Administered Medications   Medication Dose Route Frequency    buPROPion SR (WELLBUTRIN SR) tablet 150 mg  150 mg Oral DAILY    ALPRAZolam (XANAX) tablet 0.25 mg  0.25 mg Oral QID PRN    metoprolol tartrate (LOPRESSOR) tablet 25 mg  25 mg Oral TID WITH MEALS    metoprolol (LOPRESSOR) injection 1.25 mg  1.25 mg IntraVENous Q6H PRN    sodium chloride (NS) flush 5-40 mL  5-40 mL IntraVENous Q8H    sodium chloride (NS) flush 5-40 mL  5-40 mL IntraVENous PRN    acetaminophen (TYLENOL) tablet 650 mg  650 mg Oral Q4H PRN    diphenhydrAMINE (BENADRYL) capsule 25 mg  25 mg Oral QHS PRN    alum-mag hydroxide-simeth (MYLANTA) oral suspension 30 mL  30 mL Oral Q4H PRN    prenatal vit-iron fumarate-fa (PRENATAL PLUS with IRON) tablet 1 Tab  1 Tab Oral DAILY        Fuad Sarkar MD

## 2021-02-12 NOTE — PROGRESS NOTES
2/12/2021 -   JANETH:  - RUR: 18%  - Disposition is for discharge to own home with transport via family  - Patient may be ready for discharge 2/13    CM notes that patient has been upgraded to INPATIENT status    - Patient to increase ambulation today, 2/12  - Patient to have COVID test and GBS Swab today, 2/12  - Patient is anticipated to have induction of labor next week at 37 weeks  CRM: Aurora Martinez, MPH, [address] [place] [address]; Z: [phone]

## 2021-02-12 NOTE — PROGRESS NOTES
Tachycardia and SVT has improved. No PE or DVT on CTA or doppler LE respectively. Heparin stopped. Tachycardia could be anxiety related. TSH wnl. Leucocytosis noted, but no fever. UA, blood culture and chest CT clear. COVID test pending   No further recommendation from hospitalist service. Appt cardiology input   Will sign off. Please call with any question.

## 2021-02-12 NOTE — PROGRESS NOTES
Pt walked around the unit for 10 minutes tolerated well and heart rate stayed below 110. Pt was able to take a shower as well. C/O contractions throughout the day. 1600: Pt stated that contractions were getting stronger and 5 minutes apart.    1700: L&D nurse at bedside performing assessment of contractions and non stress test.

## 2021-02-12 NOTE — PROGRESS NOTES
Arrived in room to give report, patient complaining of lower abdominal cramping that just started, said it felt like a contraction. Will continue to monitor. Mother bedside bringing dinner and a treat for her birthday. Bedside shift change report given to BRYN Alejandro (oncoming nurse) by Erick Smith RN (offgoing nurse). Report included the following information SBAR, Kardex, Procedure Summary, Intake/Output, MAR, Recent Results, Med Rec Status and Cardiac Rhythm NSR.

## 2021-02-12 NOTE — PROGRESS NOTES
AntePartum Progress Note    Shahid Duran  36w2d    Patient admitted for chest pain and tachycardia after presenting with vaginal bleeding at 36w gestational age. Pt reports feeling somewhat better this morning. States she has significant anxiety about the tachycardia episodes happening again. States her HR usually goes up when she moves from sitting to standing or ambulating. They also happen when she wakes up from sleep which has made her apprehensive about sleeping. She notes that she usually notices the tachycardia and then reacts by panicking which prolongs the episode. She says the PRN xanax helps but she is apprehensive about taking that medication during pregnancy. Reports baby is \"quiet. \"  Mild cramping overnight. No LOF. Vitals:    Patient Vitals for the past 24 hrs:   BP Temp Pulse Resp SpO2 Weight   21 0839 107/69 98.7 °F (37.1 °C) 91 14 99 % --   21 0454 123/76 98.5 °F (36.9 °C) 79 16 99 % 92.1 kg (203 lb 0.7 oz)   21 0328 -- 98.1 °F (36.7 °C) 87 16 -- --   21 0035 127/66 98.1 °F (36.7 °C) 92 16 95 % --   21 0000 -- -- 85 -- -- --   21 2143 108/62 98.8 °F (37.1 °C) 93 16 99 % --   21 1831 120/70 -- 85 -- -- --   21 1658 117/75 98.5 °F (36.9 °C) 95 14 98 % --   21 1308 113/70 98.3 °F (36.8 °C) 88 14 100 % --     Temp (24hrs), Av.4 °F (36.9 °C), Min:98.1 °F (36.7 °C), Max:98.8 °F (37.1 °C)    NST:  Pending for today    Exam:  Patient without distress.                Abdomen soft, non-tender               Fundus soft and non tender               Lower extremities edema No                                           Labs:   Recent Results (from the past 24 hour(s))   CULTURE, BLOOD, PAIRED    Collection Time: 21 11:16 AM    Specimen: Blood   Result Value Ref Range    Special Requests: NO SPECIAL REQUESTS      Culture result: NO GROWTH AFTER 17 HOURS     PTT    Collection Time: 21 11:16 AM   Result Value Ref Range    aPTT 36. 8 (H) 22.1 - 31.0 sec    aPTT, therapeutic range     58.0 - 77.0 SECS       Assessment and Plan:    at 36w2d initially seen on L+D for vaginal spotting, admitted for chest pain and tachycardia. 1. Chest pain/tachycardia. Appreciate cardiology consult. S/p reassuring/normal echo, CT r/o PE, b/l LE dopplers. Continue metoprolol. Anxiety and panic attacks may be contributing to symptoms. Will start daily wellbutrin and pt is encouraged to use PRN xanax as ordered. 2. IUP at 36w2d. Reassuring NST, continue daily. Appreciate MFM input - pt meets criteria for GHTN, delivery at 37w is reasonable. Will obtain COVID test and GBS swab today today in anticipation of IOL next week. *Await cardiology recommendations for dipso planning.       Signed By: Venkat Rehman DO     2021

## 2021-02-12 NOTE — PROGRESS NOTES
1800 Daily NST completed. Reactive. Pt reported contractions every 5 minutes. Monitoring revealed irregular mild contractions.

## 2021-02-13 ENCOUNTER — HOSPITAL ENCOUNTER (EMERGENCY)
Age: 26
Discharge: HOME | End: 2021-02-13
Admitting: OBSTETRICS & GYNECOLOGY
Payer: COMMERCIAL

## 2021-02-13 ENCOUNTER — HOSPITAL ENCOUNTER (EMERGENCY)
Age: 26
Discharge: ANOTHER HOSPITAL | End: 2021-02-13
Attending: EMERGENCY MEDICINE
Payer: COMMERCIAL

## 2021-02-13 VITALS
RESPIRATION RATE: 16 BRPM | SYSTOLIC BLOOD PRESSURE: 124 MMHG | HEART RATE: 72 BPM | TEMPERATURE: 98 F | DIASTOLIC BLOOD PRESSURE: 74 MMHG | OXYGEN SATURATION: 99 %

## 2021-02-13 VITALS
SYSTOLIC BLOOD PRESSURE: 107 MMHG | RESPIRATION RATE: 16 BRPM | WEIGHT: 206.13 LBS | HEART RATE: 81 BPM | DIASTOLIC BLOOD PRESSURE: 67 MMHG | OXYGEN SATURATION: 99 % | HEIGHT: 68 IN | TEMPERATURE: 97.8 F | BODY MASS INDEX: 31.24 KG/M2

## 2021-02-13 DIAGNOSIS — R00.2 PALPITATIONS: ICD-10-CM

## 2021-02-13 DIAGNOSIS — R07.9 CHEST PAIN, UNSPECIFIED TYPE: Primary | ICD-10-CM

## 2021-02-13 LAB
ALBUMIN SERPL-MCNC: 2.7 G/DL (ref 3.5–5)
ALBUMIN/GLOB SERPL: 0.6 {RATIO} (ref 1.1–2.2)
ALP SERPL-CCNC: 121 U/L (ref 45–117)
ALT SERPL-CCNC: 34 U/L (ref 12–78)
ANION GAP SERPL CALC-SCNC: 6 MMOL/L (ref 5–15)
AST SERPL-CCNC: 29 U/L (ref 15–37)
ATRIAL RATE: 102 BPM
ATRIAL RATE: 76 BPM
ATRIAL RATE: 86 BPM
BACTERIA SPEC CULT: ABNORMAL
BASOPHILS # BLD: 0 K/UL (ref 0–0.1)
BASOPHILS NFR BLD: 0 % (ref 0–1)
BILIRUB SERPL-MCNC: 0.3 MG/DL (ref 0.2–1)
BUN SERPL-MCNC: 9 MG/DL (ref 6–20)
BUN/CREAT SERPL: 11 (ref 12–20)
CALCIUM SERPL-MCNC: 8.9 MG/DL (ref 8.5–10.1)
CALCULATED P AXIS, ECG09: 59 DEGREES
CALCULATED P AXIS, ECG09: 61 DEGREES
CALCULATED P AXIS, ECG09: 97 DEGREES
CALCULATED R AXIS, ECG10: 33 DEGREES
CALCULATED R AXIS, ECG10: 34 DEGREES
CALCULATED R AXIS, ECG10: 34 DEGREES
CALCULATED T AXIS, ECG11: 33 DEGREES
CALCULATED T AXIS, ECG11: 35 DEGREES
CALCULATED T AXIS, ECG11: 35 DEGREES
CHLORIDE SERPL-SCNC: 106 MMOL/L (ref 97–108)
CO2 SERPL-SCNC: 22 MMOL/L (ref 21–32)
COMMENT, HOLDF: NORMAL
CREAT SERPL-MCNC: 0.83 MG/DL (ref 0.55–1.02)
DIAGNOSIS, 93000: NORMAL
DIFFERENTIAL METHOD BLD: ABNORMAL
EOSINOPHIL # BLD: 0.1 K/UL (ref 0–0.4)
EOSINOPHIL NFR BLD: 0 % (ref 0–7)
ERYTHROCYTE [DISTWIDTH] IN BLOOD BY AUTOMATED COUNT: 13.4 % (ref 11.5–14.5)
GLOBULIN SER CALC-MCNC: 4.5 G/DL (ref 2–4)
GLUCOSE SERPL-MCNC: 121 MG/DL (ref 65–100)
HCT VFR BLD AUTO: 34.4 % (ref 35–47)
HGB BLD-MCNC: 11.2 G/DL (ref 11.5–16)
IMM GRANULOCYTES # BLD AUTO: 0.2 K/UL (ref 0–0.04)
IMM GRANULOCYTES NFR BLD AUTO: 2 % (ref 0–0.5)
LYMPHOCYTES # BLD: 2.3 K/UL (ref 0.8–3.5)
LYMPHOCYTES NFR BLD: 21 % (ref 12–49)
MCH RBC QN AUTO: 30.4 PG (ref 26–34)
MCHC RBC AUTO-ENTMCNC: 32.6 G/DL (ref 30–36.5)
MCV RBC AUTO: 93.2 FL (ref 80–99)
MONOCYTES # BLD: 0.9 K/UL (ref 0–1)
MONOCYTES NFR BLD: 8 % (ref 5–13)
NEUTS SEG # BLD: 7.6 K/UL (ref 1.8–8)
NEUTS SEG NFR BLD: 69 % (ref 32–75)
NRBC # BLD: 0.07 K/UL (ref 0–0.01)
NRBC BLD-RTO: 0.6 PER 100 WBC
P-R INTERVAL, ECG05: 126 MS
P-R INTERVAL, ECG05: 130 MS
P-R INTERVAL, ECG05: 136 MS
PLATELET # BLD AUTO: 231 K/UL (ref 150–400)
PMV BLD AUTO: 11.4 FL (ref 8.9–12.9)
POTASSIUM SERPL-SCNC: 4.1 MMOL/L (ref 3.5–5.1)
PROT SERPL-MCNC: 7.2 G/DL (ref 6.4–8.2)
Q-T INTERVAL, ECG07: 360 MS
Q-T INTERVAL, ECG07: 368 MS
Q-T INTERVAL, ECG07: 372 MS
QRS DURATION, ECG06: 68 MS
QRS DURATION, ECG06: 70 MS
QRS DURATION, ECG06: 74 MS
QTC CALCULATION (BEZET), ECG08: 414 MS
QTC CALCULATION (BEZET), ECG08: 445 MS
QTC CALCULATION (BEZET), ECG08: 469 MS
RBC # BLD AUTO: 3.69 M/UL (ref 3.8–5.2)
SAMPLES BEING HELD,HOLD: NORMAL
SARS-COV-2, COV2: NOT DETECTED
SERVICE CMNT-IMP: ABNORMAL
SODIUM SERPL-SCNC: 134 MMOL/L (ref 136–145)
SPECIMEN SOURCE, FCOV2M: NORMAL
TROPONIN I SERPL-MCNC: <0.05 NG/ML
VENTRICULAR RATE, ECG03: 102 BPM
VENTRICULAR RATE, ECG03: 76 BPM
VENTRICULAR RATE, ECG03: 86 BPM
WBC # BLD AUTO: 11.1 K/UL (ref 3.6–11)

## 2021-02-13 PROCEDURE — 84484 ASSAY OF TROPONIN QUANT: CPT

## 2021-02-13 PROCEDURE — 74011250637 HC RX REV CODE- 250/637: Performed by: INTERNAL MEDICINE

## 2021-02-13 PROCEDURE — 99284 EMERGENCY DEPT VISIT MOD MDM: CPT

## 2021-02-13 PROCEDURE — 74011250637 HC RX REV CODE- 250/637: Performed by: OBSTETRICS & GYNECOLOGY

## 2021-02-13 PROCEDURE — 85025 COMPLETE CBC W/AUTO DIFF WBC: CPT

## 2021-02-13 PROCEDURE — 93005 ELECTROCARDIOGRAM TRACING: CPT

## 2021-02-13 PROCEDURE — 36415 COLL VENOUS BLD VENIPUNCTURE: CPT

## 2021-02-13 PROCEDURE — 99282 EMERGENCY DEPT VISIT SF MDM: CPT

## 2021-02-13 PROCEDURE — 80053 COMPREHEN METABOLIC PANEL: CPT

## 2021-02-13 RX ORDER — BUPROPION HYDROCHLORIDE 150 MG/1
150 TABLET, EXTENDED RELEASE ORAL DAILY
Qty: 30 TAB | Refills: 2 | Status: SHIPPED | OUTPATIENT
Start: 2021-02-14

## 2021-02-13 RX ORDER — ALPRAZOLAM 0.25 MG/1
0.25 TABLET ORAL
Qty: 15 TAB | Refills: 0 | Status: SHIPPED | OUTPATIENT
Start: 2021-02-13

## 2021-02-13 RX ORDER — METOPROLOL TARTRATE 25 MG/1
25 TABLET, FILM COATED ORAL
Qty: 90 TAB | Refills: 2 | Status: SHIPPED | OUTPATIENT
Start: 2021-02-13 | End: 2021-03-15

## 2021-02-13 RX ADMIN — ALUMINUM HYDROXIDE, MAGNESIUM HYDROXIDE, AND SIMETHICONE 30 ML: 200; 200; 20 SUSPENSION ORAL at 12:19

## 2021-02-13 RX ADMIN — BUPROPION HYDROCHLORIDE 150 MG: 150 TABLET, EXTENDED RELEASE ORAL at 08:18

## 2021-02-13 RX ADMIN — Medication 10 ML: at 06:05

## 2021-02-13 RX ADMIN — METOPROLOL TARTRATE 25 MG: 25 TABLET, FILM COATED ORAL at 08:18

## 2021-02-13 RX ADMIN — Medication 1 TABLET: at 08:18

## 2021-02-13 NOTE — ED NOTES
Spoke with patient and mother at length regarding pt HR concerns. Pt IV left in place and wheeled to L&D [facility] emergency unit. Discharge instructions and paperwork provided.

## 2021-02-13 NOTE — DISCHARGE SUMMARY
Antepartum Discharge Summary     Name: Mauro Umanzor MRN: [id number]  SSN: [id number]    YOB: 1995  Age: 23 y.o. Sex: female      Allergies: Citrus and derivatives, Seafood, and Lactose    Admit Date: 2/9/2021    Discharge Date: 2/13/2021     Admitting Physician: Analia Lizarraga MD     Attending Physician:  Jcay Estrella MD     * Admission Diagnoses: Vaginal bleeding in pregnancy, third trimester [O46.93]  36 weeks gestation of pregnancy [Z3A.36]  SVT (supraventricular tachycardia) ([facility] [I47.1]  Dyspnea [R06.00]    * Discharge Diagnoses:   Hospital Problems as of 2/13/2021 Date Reviewed: 12/20/2020          Codes Class Noted - Resolved POA    SVT (supraventricular tachycardia) ([facility] ICD-10-CM: I47.1  ICD-9-CM: 427.89  2/11/2021 - Present Unknown        Dyspnea ICD-10-CM: R06.00  ICD-9-CM: 786.09  2/11/2021 - Present Unknown        36 weeks gestation of pregnancy ICD-10-CM: Z3A.36  ICD-9-CM: V22.2  2/11/2021 - Present Unknown        Vaginal bleeding in pregnancy, third trimester ICD-10-CM: O46.93  ICD-9-CM: 641.93  2/9/2021 - Present Unknown             Lab Results   Component Value Date/Time    Rubella, External Immune 07/30/2020    ABO,Rh O Positive 07/30/2020      There is no immunization history on file for this patient. * Discharge Condition: good    * Procedures:   * No surgery found [facility] Course:    Patient was initially evaluated on labor and delivery with chief complaint of vaginal bleeding in pregnancy and admitted for observation. On hospital day 2 pt reported not feeling well with tachycardia. Cardiology was consulted and pt was placed on telemetry. SVT was diagnosed and patient was started on Metoprolol. Medicine was consulted on hospital day 3 for chest pain and lower extremity edema along with SVT. Patient was started on empiric heparin. Dopplers and CT for PE and aortic dissection were negative. She had a normal echo.  [facility] was consulted - as patient has had elevated blood pressures during admission, will plan for delivery at 37 weeks in the absence of other earlier obstetric indications. Betamethasone was given x2 doses. Patient also has a history of anxiety and was started on buspar daily as well as PRN xanax for acute episodes. COVID testing was negative. GBS swab for planned induction was positive. Patient was discharged home on hospital day 5. Plan for follow-up next week with Dr. Sumner in the office.     * Disposition: Home    Discharge Medications:   Current Discharge Medication List      START taking these medications    Details   ALPRAZolam (XANAX) 0.25 mg tablet Take 1 Tab by mouth four (4) times daily as needed for Anxiety or Sleep. Max Daily Amount: 1 mg.  Qty: 15 Tab, Refills: 0    Associated Diagnoses: Anxiety      buPROPion SR (WELLBUTRIN SR) 150 mg SR tablet Take 1 Tab by mouth daily.  Qty: 30 Tab, Refills: 2      metoprolol tartrate (LOPRESSOR) 25 mg tablet Take 1 Tab by mouth three (3) times daily (with meals) for 30 days.  Qty: 90 Tab, Refills: 2         CONTINUE these medications which have NOT CHANGED    Details   omega-3 acid ethyl esters (LOVAZA) 1 gram capsule Take 2 g by mouth two (2) times a day.      PNV Comb #2-Iron-FA-Omega 3 29-1-400 mg cmpk Take  by mouth.      diphenhydrAMINE (BenadryL) 25 mg capsule Take 2 Caps by mouth every six (6) hours as needed for Itching.  Qty: 30 Cap, Refills: 0      ascorbic acid, vitamin C, (Vitamin C) 250 mg tablet Take  by mouth.             * Follow-up Care/Patient Instructions:  Activity: Activity as tolerated and Ambulate in house  Diet: Regular Diet  Wound Care: None needed    Follow-up Information     Follow up With Specialties Details Why Contact Info    Lisa Galvez PA-C Physician Assistant   [address]  [facility] [address] [phone]

## 2021-02-13 NOTE — PROGRESS NOTES
Bedside shift change report given to BRYN Urena (oncoming nurse) by Jaclyn Dodson RN (offgoing nurse). Report included the following information SBAR, Kardex, MAR, Recent Results and Cardiac Rhythm NSR.

## 2021-02-13 NOTE — DISCHARGE INSTRUCTIONS
Patient Education        Learning About Pregnancy  Your Care Instructions     Your health in the early weeks of your pregnancy is particularly important for your baby's health. Take good care of yourself. Anything you do that harms your body can also harm your baby. Make sure to go to all of your doctor appointments. Regular checkups will help keep you and your baby healthy. How can you care for yourself at home? Diet    · Eat a balanced diet. Make sure your diet includes plenty of beans, peas, and leafy green vegetables.     · Do not skip meals or go for many hours without eating. If you are nauseated, try to eat a small, healthy snack every 2 to 3 hours.     · Do not eat fish that has a high level of mercury, such as shark, swordfish, or mackerel. Do not eat more than one can of tuna each week.     · Drink plenty of fluids, enough so that your urine is light yellow or clear like water. If you have kidney, heart, or liver disease and have to limit fluids, talk with your doctor before you increase the amount of fluids you drink.     · Cut down on caffeine, such as coffee, tea, and cola.     · Do not drink alcohol, such as beer, wine, or hard liquor.     · Take a multivitamin that contains at least 400 micrograms (mcg) of folic acid to help prevent birth defects. Fortified cereal and whole wheat bread are good additional sources of folic acid.     · Increase the calcium in your diet. Try to drink a quart of skim milk each day. You may also take calcium supplements and choose foods such as cheese and yogurt. Lifestyle    · Make sure you go to your follow-up appointments.     · Get plenty of rest. You may be unusually tired while you are pregnant.     · Get at least 30 minutes of exercise on most days of the week. Walking is a good choice. If you have not exercised in the past, start out slowly. Take several short walks each day.     · Do not smoke.  If you need help quitting, talk to your doctor about stop-smoking programs. These can increase your chances of quitting for good.     · Do not touch cat feces or litter boxes. Also, wash your hands after you handle raw meat, and fully cook all meat before you eat it. Wear gloves when you work in the yard or garden, and wash your hands well when you are done. Cat feces, raw or undercooked meat, and contaminated dirt can cause an infection that may harm your baby or lead to a miscarriage.     · Do not use saunas or hot tubs. Raising your body temperature may harm your baby.     · Avoid chemical fumes, paint fumes, or poisons.     · Do not use illegal drugs or alcohol. Medicines    · Review all of your medicines with your doctor. Some of your routine medicines may need to be changed to protect your baby.     · Use acetaminophen (Tylenol) to relieve minor problems, such as a mild headache or backache or a mild fever with cold symptoms. Do not use nonsteroidal anti-inflammatory drugs (NSAIDs), such as ibuprofen (Advil, Motrin) or naproxen (Aleve), unless your doctor says it is okay.     · Do not take two or more pain medicines at the same time unless the doctor told you to. Many pain medicines have acetaminophen, which is Tylenol. Too much acetaminophen (Tylenol) can be harmful.     · Take your medicines exactly as prescribed. Call your doctor if you think you are having a problem with your medicine. To manage morning sickness    · If you feel sick when you first wake up, try eating a small snack (such as crackers) before you get out of bed. Allow some time to digest the snack, and then get out of bed slowly.     · Do not skip meals or go for long periods without eating. An empty stomach can make nausea worse.     · Eat small, frequent meals instead of three large meals each day.     · Drink plenty of fluids.  Sports drinks, such as Gatorade or Powerade, are good choices.     · Eat foods that are high in protein but low in fat.     · If you are taking iron supplements, ask your doctor if they are necessary. Iron can make nausea worse.     · Avoid any smells, such as coffee, that make you feel sick.     · Get lots of rest. Morning sickness may be worse when you are tired. Follow-up care is a key part of your treatment and safety. Be sure to make and go to all appointments, and call your doctor if you are having problems. It's also a good idea to know your test results and keep a list of the medicines you take. Where can you learn more? Go to http://www.gray.com/  Enter P993 in the search box to learn more about \"Learning About Pregnancy. \"  Current as of: February 11, 2020               Content Version: 12.6  © 2006-2020 [employer]. Care instructions adapted under license by [employer] (which disclaims liability or warranty for this information). If you have questions about a medical condition or this instruction, always ask your healthcare professional. [place] [address] any warranty or liability for your use of this information. Patient Education        Learning About Anxiety Disorders  What are anxiety disorders? Anxiety disorders are a type of medical problem. They cause severe anxiety. When you feel anxious, you feel that something bad is about to happen. This feeling interferes with your life. These disorders include:  · Generalized anxiety disorder. You feel worried and stressed about many everyday events and activities. This goes on for several months and disrupts your life on most days. · Panic disorder. You have repeated panic attacks. A panic attack is a sudden, intense fear or anxiety. It may make you feel short of breath. Your heart may pound. · Social anxiety disorder. You feel very anxious about what you will say or do in front of people. For example, you may be scared to talk or eat in public. This problem affects your daily life. · Phobias.  You are very scared of a specific object, situation, or activity. For example, you may fear spiders, high places, or small spaces. What are the symptoms? Generalized anxiety disorder  Symptoms may include:  · Feeling worried and stressed about many things almost every day. · Feeling tired or irritable. You may have a hard time concentrating. · Having headaches or muscle aches. · Having a hard time getting to sleep or staying asleep. Panic disorder  You may have repeated panic attacks when there is no reason for feeling afraid. You may change your daily activities because you worry that you will have another attack. Symptoms may include:  · Intense fear, terror, or anxiety. · Trouble breathing or very fast breathing. · Chest pain or tightness. · A heartbeat that races or is not regular. Social anxiety disorder  Symptoms may include:  · Fear about a social situation, such as eating in front of others or speaking in public. You may worry a lot. Or you may be afraid that something bad will happen. · Anxiety that can cause you to blush, sweat, and feel shaky. · A heartbeat that is faster than normal.  · A hard time focusing. Phobias  Symptoms may include:  · More fear than most people of being around an object, being in a situation, or doing an activity. You might also be stressed about the chance of being around the thing you fear. · Worry about losing control, panicking, fainting, or having physical symptoms like a faster heartbeat when you are around the situation or object. How are these disorders treated? Anxiety disorders can be treated with medicines or counseling. A combination of both may be used. Medicines may include:  · Antidepressants. These may help your symptoms by keeping chemicals in your brain in balance. · Benzodiazepines. These may give you short-term relief of your symptoms. Some people use cognitive-behavioral therapy. A therapist helps you learn to change stressful or bad thoughts into helpful thoughts.   Lead a healthy lifestyle  A healthy lifestyle may help you feel better. · Get at least 30 minutes of exercise on most days of the week. Walking is a good choice. · Eat a healthy diet. Include fruits, vegetables, lean proteins, and whole grains in your diet each day. · Try to go to bed at the same time every night. Try for 8 hours of sleep a night. · Find ways to manage stress. Try relaxation exercises. · Avoid alcohol and illegal drugs. Follow-up care is a key part of your treatment and safety. Be sure to make and go to all appointments, and call your doctor if you are having problems. It's also a good idea to know your test results and keep a list of the medicines you take. Where can you learn more? Go to http://www.gray.com/  Enter D654 in the search box to learn more about \"Learning About Anxiety Disorders. \"  Current as of: January 31, 2020               Content Version: 12.6  © 3853-3706 [employer]. Care instructions adapted under license by [employer] (which disclaims liability or warranty for this information). If you have questions about a medical condition or this instruction, always ask your healthcare professional. [place] [address] any warranty or liability for your use of this information.

## 2021-02-13 NOTE — ROUTINE PROCESS
Bedside shift change report given to Bartolo (oncoming nurse) by Romel (offgoing nurse).  Report included the following information SBAR, Kardex, Procedure Summary, Intake/Output, MAR, Recent Results, Med Rec Status and Cardiac Rhythm NSR/sinus tach
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
## 2021-02-13 NOTE — PROGRESS NOTES
1027 External fetal monitor applied for NST. Patient reports feeling irregular contractions, denies discomfort  1105 External fetal monitor discontinued, Reactive, reassuring tracing. Patient voices concern that baby is slow to move in the morning. Reassurance provided FHR tracing is reassuring at this time. Patient instructed to notify nurse if intensity or frequency of contractions increases, she has leaking of fluid from her vagina or vaginal bleeding. 200 Dr. Freida Granados on unit, viewing FHR tracing, notified of irregular, non painful contractions  1120 Dr. Freida Granados into see patient.

## 2021-02-13 NOTE — PROGRESS NOTES
I have reviewed discharge instructions with the patient and parent. The patient and parent verbalized understanding. Discharge medications reviewed with patient and parent and appropriate educational materials and side effects teaching were provided. If you experience chest pain call 911. Do not drive yourself.

## 2021-02-13 NOTE — ED NOTES
Pt d/c from Telemetry floor today after being admitted for tachycardia. Pt is 37 weeks pregnant and was initially admitted for vaginal bleeding and anxiety. Prescribed Xanax for anxiety. Pt reports, \"once I got home, my heart started racing again and my chest felt heavy. I can't take Xanax because the nurse upstairs said it will make my baby relaxed too. I can't do that. I haven't been able to sleep because every time I fall asleep, my heart starts racing again\". Pt tearful in triage and reports \"I haven't slept for a long time\".

## 2021-02-13 NOTE — PROGRESS NOTES
AntePartum Progress Note    Nathaniel Ferrer  [id number]  Estimated Date of Delivery: 3/10/21     Patient is admitted for chest pain and tachycardia after presenting with vaginal bleeding at 36w gestational age. NST was reactive today and patient reports \"normal movement of the baby\" this morning. She denies HA, vision changes, CP, SOB, N/V or leg pain bilaterally. She reports her anxiety has been better overnight at this morning since she is having fewer episodes of tachycardia. She was able to ambulate yesterday in the hallways without tachycardia or new complaints. Cardiologist in to see patient at same time - agrees that pt is stable for discharge home today. Pt denies vaginal bleeding or leaking of fluid. +FM. Occasional contraction. Vitals:    Patient Vitals for the past 24 hrs:   BP Temp Pulse Resp SpO2 Weight   21 0818 117/72 -- 87 -- -- --   21 0739 110/71 97.8 °F (36.6 °C) 81 16 99 % --   21 0335 116/79 97.7 °F (36.5 °C) 68 16 99 % 93.5 kg (206 lb 2.1 oz)   21 2343 106/62 98 °F (36.7 °C) 84 16 98 % --   21 1954 106/65 98.4 °F (36.9 °C) 80 16 98 % --   21 1715 123/81 98.5 °F (36.9 °C) 82 16 100 % --   21 1524 121/73 98.7 °F (37.1 °C) 87 14 100 % --   21 1109 122/77 -- 99 20 98 % --   21 0839 107/69 98.7 °F (37.1 °C) 91 14 99 % --     Temp (24hrs), Av.3 °F (36.8 °C), Min:97.7 °F (36.5 °C), Max:98.7 °F (37.1 °C)    I&O:   No intake/output data recorded.  1901 -  0700  In: 500 [P.O.:500]  Out: 400 [Urine:400]  NST:  Reactive, Baseline 125, moderate variability, +accels, no decels  Uterine Activity: occasional, 2 contractions noted in 30 min of monitoring    Exam:  Patient without distress.     Heart regular rate and rhythm, no murmur   Lungs CTA bilaterally               Abdomen soft, non-tender, fundus appropriate for gestational age               Fundus soft and non tender               Lower extremities no edema in LE bilaterally                                           Labs:   Recent Results (from the past 24 hour(s))   SARS-COV-2    Collection Time: 21  4:55 PM   Result Value Ref Range    SARS-CoV-2 Please find results under separate order     EKG, 12 LEAD, INITIAL    Collection Time: 21 12:06 AM   Result Value Ref Range    Ventricular Rate 81 BPM    Atrial Rate 81 BPM    P-R Interval 140 ms    QRS Duration 70 ms    Q-T Interval 368 ms    QTC Calculation (Bezet) 427 ms    Calculated P Axis 39 degrees    Calculated R Axis 36 degrees    Calculated T Axis 37 degrees    Diagnosis       ** Poor data quality, interpretation may be adversely affected  Normal sinus rhythm  When compared with ECG of 2021 13:14,  No significant change was found         Assessment and Plan:   IUP @ 36w3d   Patient Active Problem List    Diagnosis Date Noted    SVT (supraventricular tachycardia) (HCC) 2021    Dyspnea 2021    36 weeks gestation of pregnancy 2021    Vaginal bleeding in pregnancy, third trimester 2021    Acute headache 2020    Floaters in visual field, left 2020    28 weeks gestation of pregnancy 2020     Patient is a  at 36w3d initially seen on L+D for vaginal spotting, admitted for chest pain and tachycardia. 1. Chest pain/tachycardia. Appreciate cardiology consult. S/p reassuring/normal echo, CT r/o PE, b/l LE dopplers. Continue metoprolol. Anxiety and panic attacks may be contributing to symptoms. Wellbutrin started daily, pt is encouraged to use PRN xanax as ordered. 2. IUP at 36w2d. Reassuring NST, continue daily. Appreciate MFM input - pt meets criteria for GHTN, delivery at 37w is reasonable. COVID test and GBS swab collected and pending in anticipation of IOL next week.      Discussed plan of care thoroughly with both patient and her mother - agree to plan for discharge home today with follow-up later this week prior to scheduled IOL on Thursday, 2/18/21.       Ewa Lundy,   02/13/21

## 2021-02-13 NOTE — PROGRESS NOTES
Cardiology Progress Note  2021     Admit Date: 2021  Admit Diagnosis: Vaginal bleeding in pregnancy, third trimester [O46.93]  36 weeks gestation of pregnancy [Z3A.36]  SVT (supraventricular tachycardia) (HCC) [I47.1]  Dyspnea [R06.00]  CC: none currently    Assessment/Plan:   Better with occasional short bursts of SVT otherwise NSR. Review of the monitor strips shows HR at times 140's to 160's. However review of the strips shows that the tall T waves are erroneously being counte. Did well when ambulating yesterday. Ready for discharge from [facility]  I have provided a prescription for metoprolol tartrate 25 mg TID  I have asked the patient to follow up with me after she has her baby so that we can arrange for an SVT ablation. Discussed with the patient, her mom and Dr Jana Ghotra  Thanks    For other plans, see orders.   Subjective: Berny Daniel reports   Chest Pain:  [x]  none;  consistent with []  non-cardiac  []  atypical  []  angina             [x]  none now    []  on-going  Dyspnea: [x]  none    []  at rest    []  with exertion   []  improved    []  unchanged    []  worsening  PND:       [x]  none      []  overnight      Orthopnea:   [x]  none        []  improved         []  unchanged        []  worsening  Presyncope: [x]  none        []  improved         []  unchanged        []  worsening  Ambulated in hallway without symptoms  []  Yes  Ambulated in room without symptoms  []  Yes    Objective:    Physical Exam:  Overall VSSAF;    Visit Vitals  /72   Pulse 82 Comment: radial pulse   Temp 97.8 °F (36.6 °C)   Resp 16   Ht 172.7 cm (68\")   Wt 93.5 kg (206 lb 2.1 oz)   LMP 2020 (Approximate)   SpO2 99%   Breastfeeding No   BMI 31.34 kg/m²     Temp (24hrs), Av.2 °F (36.8 °C), Min:97.7 °F (36.5 °C), Max:98.7 °F (37.1 °C)    Patient Vitals for the past 8 hrs:   Pulse   21 1027 82   21 0818 87   21 0739 81   21 0335 68    Patient Vitals for the past 8 hrs:   Resp 02/13/21 0739 16   02/13/21 0335 16    Patient Vitals for the past 8 hrs:   BP   02/13/21 0818 117/72   02/13/21 0739 110/71   02/13/21 0335 116/79          Intake/Output Summary (Last 24 hours) at 2/13/2021 1130  Last data filed at 2/12/2021 1954  Gross per 24 hour   Intake 500 ml   Output --   Net 500 ml       General Appearance: Well developed, well nourished, no acute distress. Ears/Nose/Mouth/Throat:   Normal MM; anicteric. JVP: WNL   Resp:   Lungs clear to auscultation bilaterally. Nl resp effort. Cardiovascular:  RRR, S1, S2 normal, no new murmur. No gallop or rub. Abdomen:   Soft, non-tender, bowel sounds are present. Extremities: No edema bilaterally. Skin:  Neuro: Warm and dry. A/O x3, grossly nonfocal    []  cath site intact w/o hematoma or bruit; distal pulse unchanged. Data Review:     Telemetry independently reviewed : []  sinus      []  chronic afib     []  par afib    []  NSVT    ECG independently reviewed:  []  NSR         []  no significant changes  [] no new ECG provided for review  Lab results reviewed as noted below. Current medications reviewed as noted below. No results for input(s): PH, PCO2, PO2 in the last 72 hours. Recent Labs     02/11/21 0448   TROIQ <0.05     Recent Labs     02/11/21 0448 02/11/21 0340     --    K 3.8  --      --    CO2 18*  --    BUN 7  --    CREA 0.86  --    *  --    CA 9.6  --    ALB 2.9*  --    WBC  --  15.0*   HGB  --  11.1*   HCT  --  33.3*   PLT  --  226     Recent Labs     02/11/21 0448   ALT 42  40      TBILI 0.2   TP 7.2   ALB 2.9*   GLOB 4.3*     Recent Labs     02/11/21  1116 02/11/21 0340   APTT 36.8* 24.7      No results for input(s): FE, TIBC, PSAT, FERR in the last 72 hours.    Lab Results   Component Value Date/Time    Glucose (POC) 129 (H) 02/11/2021 06:55 AM       Current Facility-Administered Medications   Medication Dose Route Frequency    buPROPion SR (WELLBUTRIN SR) tablet 150 mg  150 mg Oral DAILY    ALPRAZolam (XANAX) tablet 0.25 mg  0.25 mg Oral QID PRN    metoprolol tartrate (LOPRESSOR) tablet 25 mg  25 mg Oral TID WITH MEALS    metoprolol (LOPRESSOR) injection 1.25 mg  1.25 mg IntraVENous Q6H PRN    sodium chloride (NS) flush 5-40 mL  5-40 mL IntraVENous Q8H    sodium chloride (NS) flush 5-40 mL  5-40 mL IntraVENous PRN    acetaminophen (TYLENOL) tablet 650 mg  650 mg Oral Q4H PRN    diphenhydrAMINE (BENADRYL) capsule 25 mg  25 mg Oral QHS PRN    alum-mag hydroxide-simeth (MYLANTA) oral suspension 30 mL  30 mL Oral Q4H PRN    prenatal vit-iron fumarate-fa (PRENATAL PLUS with IRON) tablet 1 Tab  1 Tab Oral DAILY        Damaris Soto MD

## 2021-02-14 LAB
ATRIAL RATE: 83 BPM
CALCULATED P AXIS, ECG09: 52 DEGREES
CALCULATED R AXIS, ECG10: 28 DEGREES
CALCULATED T AXIS, ECG11: 31 DEGREES
DIAGNOSIS, 93000: NORMAL
P-R INTERVAL, ECG05: 136 MS
Q-T INTERVAL, ECG07: 362 MS
QRS DURATION, ECG06: 72 MS
QTC CALCULATION (BEZET), ECG08: 425 MS
VENTRICULAR RATE, ECG03: 83 BPM

## 2021-02-14 NOTE — PROGRESS NOTES
2/13/2021  6:53 PM Patient arrived from the ED after a cardiac work up. Patient reports anxiety and episodes of her heart racing. 1910 Dr. Aura Rendon at bedside. Discussing plan of care. 1920 NST done. Per Dr. Aura Rendon, okay to go home.
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
## 2021-02-14 NOTE — DISCHARGE INSTRUCTIONS
Patient Education   Patient Education        Weeks 34 to 39 of Your Pregnancy: Care Instructions  Your Care Instructions     By now, your baby and your belly have grown quite large. It is almost time to give birth. Your baby's lungs are almost ready to breathe air. The bones in your baby's head are now firm enough to protect it, but soft enough to move down through the birth canal.  You may feel excited, happy, anxious, or scared. You may wonder how you will know if you are in labor or what to expect during labor. Try to be flexible in your expectations of the birth. Because each birth is different, there is no way to know exactly what childbirth will be like for you. This care sheet will help you know what to expect and how to prepare. This may make your childbirth easier. If you haven't already had the Tdap shot during this pregnancy, talk to your doctor about getting it. It will help protect your  against pertussis infection. In the 36th week, most women have a test for group B streptococcus (GBS). GBS is a common bacteria that can live in the vagina and rectum. It can make your baby sick after birth. If you test positive, you will get antibiotics during labor. The medicine will keep your baby from getting the bacteria. Follow-up care is a key part of your treatment and safety. Be sure to make and go to all appointments, and call your doctor if you are having problems. It's also a good idea to know your test results and keep a list of the medicines you take. How can you care for yourself at home? Learn about pain relief choices  · Pain is different for every woman. Talk with your doctor about your feelings about pain. · You can choose from several types of pain relief. These include medicine or breathing techniques, as well as comfort measures. You can use more than one option. · If you choose to have pain medicine during labor, talk to your doctor about your options.  Some medicines lower anxiety and help with some of the pain. Others make your lower body numb so that you won't feel pain. · Be sure to tell your doctor about your pain medicine choice before you start labor or very early in your labor. You may be able to change your mind as labor progresses. · Rarely, a woman is put to sleep by medicine given through a mask or an IV. Labor and delivery  · The first stage of labor has three parts: early, active, and transition. ? Most women have early labor at home. You can stay busy or rest, eat light snacks, drink clear fluids, and start counting contractions. ? When talking during a contraction gets hard, you may be moving to active labor. During active labor, you should head for the hospital if you are not there already. ? You are in active labor when contractions come every 3 to 4 minutes and last about 60 seconds. Your cervix is opening more rapidly. ? If your water breaks, contractions will come faster and stronger. ? During transition, your cervix is stretching, and contractions are coming more rapidly. ? You may want to push, but your cervix might not be ready. Your doctor will tell you when to push. · The second stage starts when your cervix is completely opened and you are ready to push. ? Contractions are very strong to push the baby down the birth canal.  ? You will feel the urge to push. You may feel like you need to have a bowel movement. ? You may be coached to push with contractions. These contractions will be very strong, but you will not have them as often. You can get a little rest between contractions. ? You may be emotional and irritable. You may not be aware of what is going on around you.  ? One last push, and your baby is born. · The third stage is when a few more contractions push out the placenta. This may take 30 minutes or less. · The fourth stage is the welcome recovery. You may feel overwhelmed with emotions and exhausted but alert.  This is a good time to start breastfeeding. Where can you learn more? Go to http://www.gray.com/  Enter B912 in the search box to learn more about \"Weeks 34 to 36 of Your Pregnancy: Care Instructions. \"  Current as of: February 11, 2020               Content Version: 12.6  © 0241-8591 [employer]. Care instructions adapted under license by [employer] (which disclaims liability or warranty for this information). If you have questions about a medical condition or this instruction, always ask your healthcare professional. [place] [address] any warranty or liability for your use of this information. Anxiety Disorder: Care Instructions  Your Care Instructions     Anxiety is a normal reaction to stress. Difficult situations can cause you to have symptoms such as sweaty palms and a nervous feeling. In an anxiety disorder, the symptoms are far more severe. Constant worry, muscle tension, trouble sleeping, nausea and diarrhea, and other symptoms can make normal daily activities difficult or impossible. These symptoms may occur for no reason, and they can affect your work, school, or social life. Medicines, counseling, and self-care can all help. Follow-up care is a key part of your treatment and safety. Be sure to make and go to all appointments, and call your doctor if you are having problems. It's also a good idea to know your test results and keep a list of the medicines you take. How can you care for yourself at home? · Take medicines exactly as directed. Call your doctor if you think you are having a problem with your medicine. · Go to your counseling sessions and follow-up appointments. · Recognize and accept your anxiety. Then, when you are in a situation that makes you anxious, say to yourself, \"This is not an emergency. I feel uncomfortable, but I am not in danger. I can keep going even if I feel anxious. \"  · Be kind to your body:  ? Relieve tension with exercise or a massage. ? Get enough rest.  ? Avoid alcohol, caffeine, nicotine, and illegal drugs. They can increase your anxiety level and cause sleep problems. ? Learn and do relaxation techniques. See below for more about these techniques. · Engage your mind. Get out and do something you enjoy. Go to a funny movie, or take a walk or hike. Plan your day. Having too much or too little to do can make you anxious. · Keep a record of your symptoms. Discuss your fears with a good friend or family member, or join a support group for people with similar problems. Talking to others sometimes relieves stress. · Get involved in social groups, or volunteer to help others. Being alone sometimes makes things seem worse than they are. · Get at least 30 minutes of exercise on most days of the week to relieve stress. Walking is a good choice. You also may want to do other activities, such as running, swimming, cycling, or playing tennis or team sports. Relaxation techniques  Do relaxation exercises 10 to 20 minutes a day. You can play soothing, relaxing music while you do them, if you wish. · Tell others in your house that you are going to do your relaxation exercises. Ask them not to disturb you. · Find a comfortable place, away from all distractions and noise. · Lie down on your back, or sit with your back straight. · Focus on your breathing. Make it slow and steady. · Breathe in through your nose. Breathe out through either your nose or mouth. · Breathe deeply, filling up the area between your navel and your rib cage. Breathe so that your belly goes up and down. · Do not hold your breath. · Breathe like this for 5 to 10 minutes. Notice the feeling of calmness throughout your whole body. As you continue to breathe slowly and deeply, relax by doing the following for another 5 to 10 minutes:  · Tighten and relax each muscle group in your body.  You can begin at your toes and work your way up to your head.  · Imagine your muscle groups relaxing and becoming heavy. · Empty your mind of all thoughts. · Let yourself relax more and more deeply. · Become aware of the state of calmness that surrounds you. · When your relaxation time is over, you can bring yourself back to alertness by moving your fingers and toes and then your hands and feet and then stretching and moving your entire body. Sometimes people fall asleep during relaxation, but they usually wake up shortly afterward. · Always give yourself time to return to full alertness before you drive a car or do anything that might cause an accident if you are not fully alert. Never play a relaxation tape while you drive a car. When should you call for help? Call 911 anytime you think you may need emergency care. For example, call if:    · You feel you cannot stop from hurting yourself or someone else. Keep the numbers for these national suicide hotlines: 1-047-211-TALK (1-209.792.6149) and 0-750-JYXKLCZ (3-140.667.6839). If you or someone you know talks about suicide or feeling hopeless, get help right away. Watch closely for changes in your health, and be sure to contact your doctor if:    · You have anxiety or fear that affects your life.     · You have symptoms of anxiety that are new or different from those you had before. Where can you learn more? Go to http://www.moya.com/  Enter P754 in the search box to learn more about \"Anxiety Disorder: Care Instructions. \"  Current as of: January 31, 2020               Content Version: 12.6  © 7729-3936 [employer]. Care instructions adapted under license by [employer] (which disclaims liability or warranty for this information). If you have questions about a medical condition or this instruction, always ask your healthcare professional. [address] any warranty or liability for your use of this information.

## 2021-02-16 LAB
BACTERIA SPEC CULT: NORMAL
SERVICE CMNT-IMP: NORMAL

## 2021-03-01 ENCOUNTER — HOSPITAL ENCOUNTER (EMERGENCY)
Age: 26
Discharge: HOME | End: 2021-03-01
Attending: EMERGENCY MEDICINE
Payer: COMMERCIAL

## 2021-03-01 ENCOUNTER — APPOINTMENT (OUTPATIENT)
Dept: VASCULAR SURGERY | Age: 26
End: 2021-03-01
Attending: EMERGENCY MEDICINE
Payer: COMMERCIAL

## 2021-03-01 VITALS
TEMPERATURE: 98.2 F | DIASTOLIC BLOOD PRESSURE: 72 MMHG | SYSTOLIC BLOOD PRESSURE: 119 MMHG | OXYGEN SATURATION: 99 % | HEART RATE: 69 BPM | RESPIRATION RATE: 15 BRPM

## 2021-03-01 DIAGNOSIS — M79.601 RIGHT ARM PAIN: Primary | ICD-10-CM

## 2021-03-01 DIAGNOSIS — M79.604 RIGHT LEG PAIN: ICD-10-CM

## 2021-03-01 LAB
ATRIAL RATE: 74 BPM
CALCULATED P AXIS, ECG09: 44 DEGREES
CALCULATED R AXIS, ECG10: 56 DEGREES
CALCULATED T AXIS, ECG11: 9 DEGREES
DIAGNOSIS, 93000: NORMAL
P-R INTERVAL, ECG05: 138 MS
Q-T INTERVAL, ECG07: 382 MS
QRS DURATION, ECG06: 82 MS
QTC CALCULATION (BEZET), ECG08: 424 MS
VENTRICULAR RATE, ECG03: 74 BPM

## 2021-03-01 PROCEDURE — 93971 EXTREMITY STUDY: CPT

## 2021-03-01 PROCEDURE — 99283 EMERGENCY DEPT VISIT LOW MDM: CPT

## 2021-03-01 PROCEDURE — 93005 ELECTROCARDIOGRAM TRACING: CPT

## 2021-03-01 NOTE — ED NOTES
ED Triage    Patient arrives ambulatory to ED with complaints of right arm/leg pain and chest pain     Patient is 1 week post partum     Patient states she was seen at [address] [place] [address] yesterday, was told they could not ultrasound for blood clot and patient had declined blood thinner r/t being 1 week PP
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
## 2021-12-01 ENCOUNTER — OFFICE VISIT (OUTPATIENT)
Dept: NEUROLOGY | Age: 26
End: 2021-12-01
Payer: COMMERCIAL

## 2021-12-01 VITALS
RESPIRATION RATE: 14 BRPM | DIASTOLIC BLOOD PRESSURE: 66 MMHG | SYSTOLIC BLOOD PRESSURE: 115 MMHG | WEIGHT: 204 LBS | OXYGEN SATURATION: 100 % | HEART RATE: 81 BPM | BODY MASS INDEX: 30.92 KG/M2 | HEIGHT: 68 IN

## 2021-12-01 DIAGNOSIS — G44.219 EPISODIC TENSION-TYPE HEADACHE, NOT INTRACTABLE: Primary | ICD-10-CM

## 2021-12-01 DIAGNOSIS — M79.18 MYOFASCIAL PAIN: ICD-10-CM

## 2021-12-01 PROCEDURE — 99204 OFFICE O/P NEW MOD 45 MIN: CPT | Performed by: SPECIALIST

## 2021-12-01 RX ORDER — CYCLOBENZAPRINE HCL 5 MG
TABLET ORAL
COMMUNITY
Start: 2021-10-11

## 2021-12-01 NOTE — LETTER
12/1/2021    Patient: Mckenna Person   YOB: 1995   Date of Visit: 12/1/2021     Brian Mensah PA-C  [address] [facility] [address]  Via Fax: [phone]    Dear Brian Mensah PA-C,      Thank you for referring Ms. Mckenna Person to [facility] for evaluation. My notes for this consultation are attached. If you have questions, please do not hesitate to call me. I look forward to following your patient along with you.       Sincerely,    Raul Rodriguez MD
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
## 2021-12-01 NOTE — PATIENT INSTRUCTIONS
Patient history reviewed patient examined. The defining line on the headache story today is more toward tension type headaches than migraines but that could change. She is breast-feeding so we will ask her to run by the drugs nortriptyline and Periactin by her doctor. Does have a fair number of myofascial complaints and will refer to rheumatology but she has been warned it could be a waiting process. Exam looks normal on first visit. Cheap advice is to get good sleep minimize stress and some form of regular exercise could help. Further suggestions could follow. My revisit is schedule is very congested and suggest a revisit in about 6 to 8 weeks.

## 2021-12-01 NOTE — PROGRESS NOTES
Chief Complaint   Patient presents with    New Patient     migraines- referred by PCP // L sided migraines started post pregnancy (gave birth in Feb 2021) // [facility] and neck [facility]  // breastfeeding

## 2021-12-15 ENCOUNTER — APPOINTMENT (OUTPATIENT)
Dept: GENERAL RADIOLOGY | Age: 26
End: 2021-12-15
Attending: PHYSICIAN ASSISTANT
Payer: MEDICAID

## 2021-12-15 ENCOUNTER — HOSPITAL ENCOUNTER (EMERGENCY)
Age: 26
Discharge: HOME | End: 2021-12-15
Attending: EMERGENCY MEDICINE
Payer: MEDICAID

## 2021-12-15 ENCOUNTER — APPOINTMENT (OUTPATIENT)
Dept: ULTRASOUND IMAGING | Age: 26
End: 2021-12-15
Attending: PHYSICIAN ASSISTANT
Payer: MEDICAID

## 2021-12-15 VITALS
HEIGHT: 68 IN | RESPIRATION RATE: 20 BRPM | BODY MASS INDEX: 30.46 KG/M2 | SYSTOLIC BLOOD PRESSURE: 139 MMHG | HEART RATE: 95 BPM | TEMPERATURE: 97.8 F | OXYGEN SATURATION: 99 % | DIASTOLIC BLOOD PRESSURE: 73 MMHG | WEIGHT: 201 LBS

## 2021-12-15 DIAGNOSIS — R10.84 ABDOMINAL PAIN, GENERALIZED: Primary | ICD-10-CM

## 2021-12-15 DIAGNOSIS — K29.90 GASTRITIS AND DUODENITIS: ICD-10-CM

## 2021-12-15 LAB
ALBUMIN SERPL-MCNC: 4.4 G/DL (ref 3.5–5)
ALBUMIN/GLOB SERPL: 1.1 {RATIO} (ref 1.1–2.2)
ALP SERPL-CCNC: 73 U/L (ref 45–117)
ALT SERPL-CCNC: 19 U/L (ref 12–78)
ANION GAP SERPL CALC-SCNC: 9 MMOL/L (ref 5–15)
APPEARANCE UR: ABNORMAL
AST SERPL-CCNC: 15 U/L (ref 15–37)
BACTERIA URNS QL MICRO: ABNORMAL /HPF
BASOPHILS # BLD: 0 K/UL (ref 0–0.1)
BASOPHILS NFR BLD: 0 % (ref 0–1)
BILIRUB SERPL-MCNC: 0.5 MG/DL (ref 0.2–1)
BILIRUB UR QL: NEGATIVE
BUN SERPL-MCNC: 8 MG/DL (ref 6–20)
BUN/CREAT SERPL: 9 (ref 12–20)
CALCIUM SERPL-MCNC: 9.6 MG/DL (ref 8.5–10.1)
CHLORIDE SERPL-SCNC: 107 MMOL/L (ref 97–108)
CO2 SERPL-SCNC: 23 MMOL/L (ref 21–32)
COLOR UR: ABNORMAL
CREAT SERPL-MCNC: 0.94 MG/DL (ref 0.55–1.02)
DIFFERENTIAL METHOD BLD: NORMAL
EOSINOPHIL # BLD: 0 K/UL (ref 0–0.4)
EOSINOPHIL NFR BLD: 0 % (ref 0–7)
EPITH CASTS URNS QL MICRO: ABNORMAL /LPF
ERYTHROCYTE [DISTWIDTH] IN BLOOD BY AUTOMATED COUNT: 11.8 % (ref 11.5–14.5)
GLOBULIN SER CALC-MCNC: 4.1 G/DL (ref 2–4)
GLUCOSE SERPL-MCNC: 107 MG/DL (ref 65–100)
GLUCOSE UR STRIP.AUTO-MCNC: NEGATIVE MG/DL
HCG UR QL: NEGATIVE
HCT VFR BLD AUTO: 42.8 % (ref 35–47)
HGB BLD-MCNC: 15.1 G/DL (ref 11.5–16)
HGB UR QL STRIP: ABNORMAL
IMM GRANULOCYTES # BLD AUTO: 0 K/UL (ref 0–0.04)
IMM GRANULOCYTES NFR BLD AUTO: 0 % (ref 0–0.5)
KETONES UR QL STRIP.AUTO: 80 MG/DL
LEUKOCYTE ESTERASE UR QL STRIP.AUTO: ABNORMAL
LIPASE SERPL-CCNC: 26 U/L (ref 73–393)
LYMPHOCYTES # BLD: 1.1 K/UL (ref 0.8–3.5)
LYMPHOCYTES NFR BLD: 22 % (ref 12–49)
MCH RBC QN AUTO: 31.3 PG (ref 26–34)
MCHC RBC AUTO-ENTMCNC: 35.3 G/DL (ref 30–36.5)
MCV RBC AUTO: 88.8 FL (ref 80–99)
MONOCYTES # BLD: 0.3 K/UL (ref 0–1)
MONOCYTES NFR BLD: 6 % (ref 5–13)
MUCOUS THREADS URNS QL MICRO: ABNORMAL /LPF
NEUTS SEG # BLD: 3.5 K/UL (ref 1.8–8)
NEUTS SEG NFR BLD: 72 % (ref 32–75)
NITRITE UR QL STRIP.AUTO: NEGATIVE
NRBC # BLD: 0 K/UL (ref 0–0.01)
NRBC BLD-RTO: 0 PER 100 WBC
PH UR STRIP: 7.5 [PH] (ref 5–8)
PLATELET # BLD AUTO: 296 K/UL (ref 150–400)
PMV BLD AUTO: 11 FL (ref 8.9–12.9)
POTASSIUM SERPL-SCNC: 4 MMOL/L (ref 3.5–5.1)
PROT SERPL-MCNC: 8.5 G/DL (ref 6.4–8.2)
PROT UR STRIP-MCNC: NEGATIVE MG/DL
RBC # BLD AUTO: 4.82 M/UL (ref 3.8–5.2)
RBC #/AREA URNS HPF: ABNORMAL /HPF (ref 0–5)
SODIUM SERPL-SCNC: 139 MMOL/L (ref 136–145)
SP GR UR REFRACTOMETRY: 1.02 (ref 1–1.03)
TROPONIN-HIGH SENSITIVITY: 5 NG/L (ref 0–51)
UR CULT HOLD, URHOLD: NORMAL
UROBILINOGEN UR QL STRIP.AUTO: 0.2 EU/DL (ref 0.2–1)
WBC # BLD AUTO: 4.8 K/UL (ref 3.6–11)
WBC URNS QL MICRO: ABNORMAL /HPF (ref 0–4)

## 2021-12-15 PROCEDURE — 83690 ASSAY OF LIPASE: CPT

## 2021-12-15 PROCEDURE — 84484 ASSAY OF TROPONIN QUANT: CPT

## 2021-12-15 PROCEDURE — 93005 ELECTROCARDIOGRAM TRACING: CPT

## 2021-12-15 PROCEDURE — 81001 URINALYSIS AUTO W/SCOPE: CPT

## 2021-12-15 PROCEDURE — 99283 EMERGENCY DEPT VISIT LOW MDM: CPT

## 2021-12-15 PROCEDURE — 71046 X-RAY EXAM CHEST 2 VIEWS: CPT

## 2021-12-15 PROCEDURE — 80053 COMPREHEN METABOLIC PANEL: CPT

## 2021-12-15 PROCEDURE — 36415 COLL VENOUS BLD VENIPUNCTURE: CPT

## 2021-12-15 PROCEDURE — 81025 URINE PREGNANCY TEST: CPT

## 2021-12-15 PROCEDURE — 85025 COMPLETE CBC W/AUTO DIFF WBC: CPT

## 2021-12-15 PROCEDURE — 74011250636 HC RX REV CODE- 250/636: Performed by: PHYSICIAN ASSISTANT

## 2021-12-15 PROCEDURE — 76705 ECHO EXAM OF ABDOMEN: CPT

## 2021-12-15 RX ORDER — FAMOTIDINE 10 MG/ML
20 INJECTION INTRAVENOUS
Status: DISCONTINUED | OUTPATIENT
Start: 2021-12-15 | End: 2021-12-15 | Stop reason: HOSPADM

## 2021-12-15 RX ADMIN — SODIUM CHLORIDE 1000 ML: 9 INJECTION, SOLUTION INTRAVENOUS at 15:44
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
## 2021-12-15 NOTE — ED TRIAGE NOTES
Pt arrives to the ER for complaints of abdominal pain that started at 0400 this morning. Pain is in the middle and radiates to right upper side. Pt states she went to the GI doctor this morning and was referred here. Reports nausea and diarrhea but denies vomiting. Pt states she has been taking prilosec to help with the pain but has not helped. Reports she was recently seen at another ER and medications did not help. PMH of stomach ulcers.

## 2021-12-15 NOTE — DISCHARGE INSTRUCTIONS
Continue the Prilosec, Carafate and dicyclomine as directed by GI. Follow-up with GI for further testing. Avoid greasy, fatty, spicy or acidic foods.   Return to the emergency department if your symptoms significantly worsen

## 2021-12-16 LAB
ATRIAL RATE: 97 BPM
CALCULATED P AXIS, ECG09: 63 DEGREES
CALCULATED R AXIS, ECG10: 20 DEGREES
CALCULATED T AXIS, ECG11: 33 DEGREES
DIAGNOSIS, 93000: NORMAL
P-R INTERVAL, ECG05: 134 MS
Q-T INTERVAL, ECG07: 378 MS
QRS DURATION, ECG06: 72 MS
QTC CALCULATION (BEZET), ECG08: 475 MS
VENTRICULAR RATE, ECG03: 95 BPM

## 2021-12-25 ENCOUNTER — APPOINTMENT (OUTPATIENT)
Dept: ULTRASOUND IMAGING | Age: 26
End: 2021-12-25
Attending: EMERGENCY MEDICINE
Payer: MEDICAID

## 2021-12-25 ENCOUNTER — HOSPITAL ENCOUNTER (EMERGENCY)
Age: 26
Discharge: HOME | End: 2021-12-25
Attending: EMERGENCY MEDICINE
Payer: MEDICAID

## 2021-12-25 VITALS
TEMPERATURE: 98 F | HEIGHT: 68 IN | WEIGHT: 196 LBS | SYSTOLIC BLOOD PRESSURE: 128 MMHG | RESPIRATION RATE: 16 BRPM | HEART RATE: 81 BPM | OXYGEN SATURATION: 100 % | BODY MASS INDEX: 29.7 KG/M2 | DIASTOLIC BLOOD PRESSURE: 81 MMHG

## 2021-12-25 DIAGNOSIS — R10.13 DYSPEPSIA: ICD-10-CM

## 2021-12-25 DIAGNOSIS — R10.13 ABDOMINAL PAIN, EPIGASTRIC: Primary | ICD-10-CM

## 2021-12-25 LAB
ALBUMIN SERPL-MCNC: 3.7 G/DL (ref 3.5–5)
ALBUMIN/GLOB SERPL: 0.9 {RATIO} (ref 1.1–2.2)
ALP SERPL-CCNC: 72 U/L (ref 45–117)
ALT SERPL-CCNC: 20 U/L (ref 12–78)
ANION GAP SERPL CALC-SCNC: 9 MMOL/L (ref 5–15)
AST SERPL-CCNC: 10 U/L (ref 15–37)
BASOPHILS # BLD: 0 K/UL (ref 0–0.1)
BASOPHILS NFR BLD: 0 % (ref 0–1)
BILIRUB SERPL-MCNC: 0.3 MG/DL (ref 0.2–1)
BUN SERPL-MCNC: 5 MG/DL (ref 6–20)
BUN/CREAT SERPL: 6 (ref 12–20)
CALCIUM SERPL-MCNC: 8.9 MG/DL (ref 8.5–10.1)
CHLORIDE SERPL-SCNC: 110 MMOL/L (ref 97–108)
CO2 SERPL-SCNC: 21 MMOL/L (ref 21–32)
CREAT SERPL-MCNC: 0.86 MG/DL (ref 0.55–1.02)
DIFFERENTIAL METHOD BLD: ABNORMAL
EOSINOPHIL # BLD: 0.1 K/UL (ref 0–0.4)
EOSINOPHIL NFR BLD: 1 % (ref 0–7)
ERYTHROCYTE [DISTWIDTH] IN BLOOD BY AUTOMATED COUNT: 12.1 % (ref 11.5–14.5)
GLOBULIN SER CALC-MCNC: 3.9 G/DL (ref 2–4)
GLUCOSE SERPL-MCNC: 101 MG/DL (ref 65–100)
HCT VFR BLD AUTO: 40.6 % (ref 35–47)
HGB BLD-MCNC: 14 G/DL (ref 11.5–16)
IMM GRANULOCYTES # BLD AUTO: 0 K/UL (ref 0–0.04)
IMM GRANULOCYTES NFR BLD AUTO: 0 % (ref 0–0.5)
LIPASE SERPL-CCNC: 31 U/L (ref 73–393)
LYMPHOCYTES # BLD: 1.4 K/UL (ref 0.8–3.5)
LYMPHOCYTES NFR BLD: 40 % (ref 12–49)
MCH RBC QN AUTO: 31.1 PG (ref 26–34)
MCHC RBC AUTO-ENTMCNC: 34.5 G/DL (ref 30–36.5)
MCV RBC AUTO: 90.2 FL (ref 80–99)
MONOCYTES # BLD: 0.3 K/UL (ref 0–1)
MONOCYTES NFR BLD: 9 % (ref 5–13)
NEUTS SEG # BLD: 1.7 K/UL (ref 1.8–8)
NEUTS SEG NFR BLD: 50 % (ref 32–75)
NRBC # BLD: 0 K/UL (ref 0–0.01)
NRBC BLD-RTO: 0 PER 100 WBC
PLATELET # BLD AUTO: 244 K/UL (ref 150–400)
PMV BLD AUTO: 10.8 FL (ref 8.9–12.9)
POTASSIUM SERPL-SCNC: 3.6 MMOL/L (ref 3.5–5.1)
PROT SERPL-MCNC: 7.6 G/DL (ref 6.4–8.2)
RBC # BLD AUTO: 4.5 M/UL (ref 3.8–5.2)
SODIUM SERPL-SCNC: 140 MMOL/L (ref 136–145)
WBC # BLD AUTO: 3.5 K/UL (ref 3.6–11)

## 2021-12-25 PROCEDURE — 74011250637 HC RX REV CODE- 250/637: Performed by: EMERGENCY MEDICINE

## 2021-12-25 PROCEDURE — 85025 COMPLETE CBC W/AUTO DIFF WBC: CPT

## 2021-12-25 PROCEDURE — 93005 ELECTROCARDIOGRAM TRACING: CPT

## 2021-12-25 PROCEDURE — 74011250636 HC RX REV CODE- 250/636: Performed by: EMERGENCY MEDICINE

## 2021-12-25 PROCEDURE — 74011000250 HC RX REV CODE- 250: Performed by: EMERGENCY MEDICINE

## 2021-12-25 PROCEDURE — 99283 EMERGENCY DEPT VISIT LOW MDM: CPT

## 2021-12-25 PROCEDURE — 76705 ECHO EXAM OF ABDOMEN: CPT

## 2021-12-25 PROCEDURE — 83690 ASSAY OF LIPASE: CPT

## 2021-12-25 PROCEDURE — 36415 COLL VENOUS BLD VENIPUNCTURE: CPT

## 2021-12-25 PROCEDURE — 80053 COMPREHEN METABOLIC PANEL: CPT

## 2021-12-25 RX ADMIN — LIDOCAINE HYDROCHLORIDE 40 ML: 20 SOLUTION OROPHARYNGEAL at 14:44

## 2021-12-25 RX ADMIN — SODIUM CHLORIDE 1000 ML: 9 INJECTION, SOLUTION INTRAVENOUS at 14:41

## 2021-12-25 NOTE — ED TRIAGE NOTES
Patient reports abdomina cramping on the RUQ and RLQ radiating to the right side. Reports \"I feel lump in my throat, difficulty breathing, heartburn, epigastric pain\". Patient is amble to speak full sentences without SOB. Takes Prilosec at home with no relieve.
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
## 2021-12-27 LAB
ATRIAL RATE: 98 BPM
CALCULATED P AXIS, ECG09: 48 DEGREES
CALCULATED R AXIS, ECG10: 20 DEGREES
CALCULATED T AXIS, ECG11: 25 DEGREES
DIAGNOSIS, 93000: NORMAL
P-R INTERVAL, ECG05: 138 MS
Q-T INTERVAL, ECG07: 360 MS
QRS DURATION, ECG06: 74 MS
QTC CALCULATION (BEZET), ECG08: 459 MS
VENTRICULAR RATE, ECG03: 98 BPM

## 2022-01-10 ENCOUNTER — APPOINTMENT (OUTPATIENT)
Dept: GENERAL RADIOLOGY | Age: 27
End: 2022-01-10
Payer: MEDICAID

## 2022-01-10 ENCOUNTER — HOSPITAL ENCOUNTER (EMERGENCY)
Age: 27
Discharge: HOME | End: 2022-01-10
Attending: EMERGENCY MEDICINE
Payer: MEDICAID

## 2022-01-10 VITALS
BODY MASS INDEX: 29.4 KG/M2 | DIASTOLIC BLOOD PRESSURE: 75 MMHG | RESPIRATION RATE: 16 BRPM | WEIGHT: 194 LBS | OXYGEN SATURATION: 100 % | HEART RATE: 80 BPM | TEMPERATURE: 97.8 F | SYSTOLIC BLOOD PRESSURE: 113 MMHG | HEIGHT: 68 IN

## 2022-01-10 DIAGNOSIS — R07.9 CHEST PAIN, UNSPECIFIED TYPE: Primary | ICD-10-CM

## 2022-01-10 DIAGNOSIS — R00.2 PALPITATIONS: ICD-10-CM

## 2022-01-10 LAB
ALBUMIN SERPL-MCNC: 4 G/DL (ref 3.5–5)
ALBUMIN/GLOB SERPL: 0.9 {RATIO} (ref 1.1–2.2)
ALP SERPL-CCNC: 80 U/L (ref 45–117)
ALT SERPL-CCNC: 20 U/L (ref 12–78)
ANION GAP SERPL CALC-SCNC: 5 MMOL/L (ref 5–15)
AST SERPL-CCNC: 25 U/L (ref 15–37)
ATRIAL RATE: 123 BPM
BASOPHILS # BLD: 0 K/UL (ref 0–0.1)
BASOPHILS NFR BLD: 0 % (ref 0–1)
BILIRUB SERPL-MCNC: 0.5 MG/DL (ref 0.2–1)
BUN SERPL-MCNC: 6 MG/DL (ref 6–20)
BUN/CREAT SERPL: 7 (ref 12–20)
CALCIUM SERPL-MCNC: 9.2 MG/DL (ref 8.5–10.1)
CALCULATED P AXIS, ECG09: 72 DEGREES
CALCULATED R AXIS, ECG10: 57 DEGREES
CALCULATED T AXIS, ECG11: 51 DEGREES
CHLORIDE SERPL-SCNC: 111 MMOL/L (ref 97–108)
CO2 SERPL-SCNC: 23 MMOL/L (ref 21–32)
CREAT SERPL-MCNC: 0.88 MG/DL (ref 0.55–1.02)
D DIMER PPP FEU-MCNC: 0.2 MG/L FEU (ref 0–0.65)
DIAGNOSIS, 93000: NORMAL
DIFFERENTIAL METHOD BLD: ABNORMAL
EOSINOPHIL # BLD: 0 K/UL (ref 0–0.4)
EOSINOPHIL NFR BLD: 1 % (ref 0–7)
ERYTHROCYTE [DISTWIDTH] IN BLOOD BY AUTOMATED COUNT: 11.9 % (ref 11.5–14.5)
GLOBULIN SER CALC-MCNC: 4.4 G/DL (ref 2–4)
GLUCOSE SERPL-MCNC: 101 MG/DL (ref 65–100)
HCG UR QL: NEGATIVE
HCT VFR BLD AUTO: 44 % (ref 35–47)
HGB BLD-MCNC: 14.8 G/DL (ref 11.5–16)
IMM GRANULOCYTES # BLD AUTO: 0 K/UL (ref 0–0.04)
IMM GRANULOCYTES NFR BLD AUTO: 0 % (ref 0–0.5)
LYMPHOCYTES # BLD: 1.5 K/UL (ref 0.8–3.5)
LYMPHOCYTES NFR BLD: 42 % (ref 12–49)
MCH RBC QN AUTO: 29.7 PG (ref 26–34)
MCHC RBC AUTO-ENTMCNC: 33.6 G/DL (ref 30–36.5)
MCV RBC AUTO: 88.4 FL (ref 80–99)
MONOCYTES # BLD: 0.3 K/UL (ref 0–1)
MONOCYTES NFR BLD: 7 % (ref 5–13)
NEUTS SEG # BLD: 1.7 K/UL (ref 1.8–8)
NEUTS SEG NFR BLD: 50 % (ref 32–75)
NRBC # BLD: 0 K/UL (ref 0–0.01)
NRBC BLD-RTO: 0 PER 100 WBC
P-R INTERVAL, ECG05: 128 MS
PLATELET # BLD AUTO: 297 K/UL (ref 150–400)
PMV BLD AUTO: 11.5 FL (ref 8.9–12.9)
POTASSIUM SERPL-SCNC: 4.4 MMOL/L (ref 3.5–5.1)
PROT SERPL-MCNC: 8.4 G/DL (ref 6.4–8.2)
Q-T INTERVAL, ECG07: 300 MS
QRS DURATION, ECG06: 70 MS
QTC CALCULATION (BEZET), ECG08: 429 MS
RBC # BLD AUTO: 4.98 M/UL (ref 3.8–5.2)
SODIUM SERPL-SCNC: 139 MMOL/L (ref 136–145)
TROPONIN-HIGH SENSITIVITY: 6 NG/L (ref 0–51)
VENTRICULAR RATE, ECG03: 123 BPM
WBC # BLD AUTO: 3.5 K/UL (ref 3.6–11)

## 2022-01-10 PROCEDURE — 85379 FIBRIN DEGRADATION QUANT: CPT

## 2022-01-10 PROCEDURE — 81025 URINE PREGNANCY TEST: CPT

## 2022-01-10 PROCEDURE — 93005 ELECTROCARDIOGRAM TRACING: CPT

## 2022-01-10 PROCEDURE — 80053 COMPREHEN METABOLIC PANEL: CPT

## 2022-01-10 PROCEDURE — 36415 COLL VENOUS BLD VENIPUNCTURE: CPT

## 2022-01-10 PROCEDURE — 99283 EMERGENCY DEPT VISIT LOW MDM: CPT

## 2022-01-10 PROCEDURE — 84484 ASSAY OF TROPONIN QUANT: CPT

## 2022-01-10 PROCEDURE — 85025 COMPLETE CBC W/AUTO DIFF WBC: CPT

## 2022-01-10 PROCEDURE — 71046 X-RAY EXAM CHEST 2 VIEWS: CPT
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
## 2022-02-07 ENCOUNTER — HOSPITAL ENCOUNTER (EMERGENCY)
Age: 27
Discharge: HOME | End: 2022-02-08
Attending: EMERGENCY MEDICINE
Payer: MEDICAID

## 2022-02-07 VITALS
OXYGEN SATURATION: 100 % | BODY MASS INDEX: 30.1 KG/M2 | SYSTOLIC BLOOD PRESSURE: 128 MMHG | HEART RATE: 88 BPM | RESPIRATION RATE: 18 BRPM | DIASTOLIC BLOOD PRESSURE: 79 MMHG | TEMPERATURE: 98.5 F | HEIGHT: 67 IN | WEIGHT: 191.8 LBS

## 2022-02-07 DIAGNOSIS — R00.2 PALPITATIONS WITH REGULAR CARDIAC RHYTHM: Primary | ICD-10-CM

## 2022-02-07 LAB
ANION GAP SERPL CALC-SCNC: 8 MMOL/L (ref 5–15)
BASOPHILS # BLD: 0 K/UL (ref 0–0.1)
BASOPHILS NFR BLD: 0 % (ref 0–1)
BUN SERPL-MCNC: 6 MG/DL (ref 6–20)
BUN/CREAT SERPL: 6 (ref 12–20)
CALCIUM SERPL-MCNC: 9.2 MG/DL (ref 8.5–10.1)
CHLORIDE SERPL-SCNC: 105 MMOL/L (ref 97–108)
CO2 SERPL-SCNC: 25 MMOL/L (ref 21–32)
COMMENT, HOLDF: NORMAL
CREAT SERPL-MCNC: 0.98 MG/DL (ref 0.55–1.02)
DIFFERENTIAL METHOD BLD: ABNORMAL
EOSINOPHIL # BLD: 0.1 K/UL (ref 0–0.4)
EOSINOPHIL NFR BLD: 1 % (ref 0–7)
ERYTHROCYTE [DISTWIDTH] IN BLOOD BY AUTOMATED COUNT: 11.9 % (ref 11.5–14.5)
GLUCOSE SERPL-MCNC: 95 MG/DL (ref 65–100)
HCT VFR BLD AUTO: 40 % (ref 35–47)
HGB BLD-MCNC: 13.8 G/DL (ref 11.5–16)
IMM GRANULOCYTES # BLD AUTO: 0 K/UL (ref 0–0.04)
IMM GRANULOCYTES NFR BLD AUTO: 0 % (ref 0–0.5)
LYMPHOCYTES # BLD: 3.1 K/UL (ref 0.8–3.5)
LYMPHOCYTES NFR BLD: 50 % (ref 12–49)
MCH RBC QN AUTO: 31.1 PG (ref 26–34)
MCHC RBC AUTO-ENTMCNC: 34.5 G/DL (ref 30–36.5)
MCV RBC AUTO: 90.1 FL (ref 80–99)
MONOCYTES # BLD: 0.5 K/UL (ref 0–1)
MONOCYTES NFR BLD: 7 % (ref 5–13)
NEUTS SEG # BLD: 2.7 K/UL (ref 1.8–8)
NEUTS SEG NFR BLD: 42 % (ref 32–75)
NRBC # BLD: 0 K/UL (ref 0–0.01)
NRBC BLD-RTO: 0 PER 100 WBC
PLATELET # BLD AUTO: 330 K/UL (ref 150–400)
PMV BLD AUTO: 10.8 FL (ref 8.9–12.9)
POTASSIUM SERPL-SCNC: 3.3 MMOL/L (ref 3.5–5.1)
RBC # BLD AUTO: 4.44 M/UL (ref 3.8–5.2)
SAMPLES BEING HELD,HOLD: NORMAL
SODIUM SERPL-SCNC: 138 MMOL/L (ref 136–145)
TROPONIN-HIGH SENSITIVITY: 5 NG/L (ref 0–51)
WBC # BLD AUTO: 6.3 K/UL (ref 3.6–11)

## 2022-02-07 PROCEDURE — 99282 EMERGENCY DEPT VISIT SF MDM: CPT

## 2022-02-07 PROCEDURE — 80048 BASIC METABOLIC PNL TOTAL CA: CPT

## 2022-02-07 PROCEDURE — 93005 ELECTROCARDIOGRAM TRACING: CPT

## 2022-02-07 PROCEDURE — 84484 ASSAY OF TROPONIN QUANT: CPT

## 2022-02-07 PROCEDURE — 36415 COLL VENOUS BLD VENIPUNCTURE: CPT

## 2022-02-07 PROCEDURE — 85025 COMPLETE CBC W/AUTO DIFF WBC: CPT

## 2022-02-07 RX ORDER — POTASSIUM CHLORIDE 750 MG/1
40 TABLET, FILM COATED, EXTENDED RELEASE ORAL
Status: COMPLETED | OUTPATIENT
Start: 2022-02-07 | End: 2022-02-08

## 2022-02-08 PROCEDURE — 74011250637 HC RX REV CODE- 250/637: Performed by: STUDENT IN AN ORGANIZED HEALTH CARE EDUCATION/TRAINING PROGRAM

## 2022-02-08 PROCEDURE — 74011250636 HC RX REV CODE- 250/636: Performed by: STUDENT IN AN ORGANIZED HEALTH CARE EDUCATION/TRAINING PROGRAM

## 2022-02-08 RX ADMIN — SODIUM CHLORIDE 1000 ML: 9 INJECTION, SOLUTION INTRAVENOUS at 00:39

## 2022-02-08 RX ADMIN — POTASSIUM CHLORIDE 40 MEQ: 750 TABLET, EXTENDED RELEASE ORAL at 00:11
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
## 2022-02-08 NOTE — ED TRIAGE NOTES
Janet Salcedo returned call and LVM stating that the appointment made was okay. Pt ambulatory into triage area without complications. She has multiple complaints which she states she's been having for some time now. Today she c/o palpitations.

## 2022-02-08 NOTE — DISCHARGE INSTRUCTIONS
Continue to keep an eye on symptoms and return with any changes or worsening. Follow-up with PCP and cardiology. Date of Outreach Update: Kristal Sher was seen and assessed. Previous Outreach assessment has been reviewed. There have been no significant clinical changes since the completion of the last dated Outreach assessment. Pt resting in bed, visiting with family at bedside. NAD. No needs voiced. Will continue to follow up per outreach protocol.     Signed By:   Lance Jones RN    March 18, 2021 7:17 PM

## 2022-02-09 LAB
ATRIAL RATE: 80 BPM
CALCULATED P AXIS, ECG09: 62 DEGREES
CALCULATED R AXIS, ECG10: 38 DEGREES
CALCULATED T AXIS, ECG11: 39 DEGREES
DIAGNOSIS, 93000: NORMAL
P-R INTERVAL, ECG05: 138 MS
Q-T INTERVAL, ECG07: 398 MS
QRS DURATION, ECG06: 76 MS
QTC CALCULATION (BEZET), ECG08: 459 MS
VENTRICULAR RATE, ECG03: 80 BPM

## 2022-02-10 ENCOUNTER — HOSPITAL ENCOUNTER (EMERGENCY)
Age: 27
Discharge: HOME | End: 2022-02-10
Attending: EMERGENCY MEDICINE
Payer: MEDICAID

## 2022-02-10 ENCOUNTER — APPOINTMENT (OUTPATIENT)
Dept: CT IMAGING | Age: 27
End: 2022-02-10
Attending: EMERGENCY MEDICINE
Payer: MEDICAID

## 2022-02-10 VITALS
SYSTOLIC BLOOD PRESSURE: 136 MMHG | TEMPERATURE: 97.9 F | DIASTOLIC BLOOD PRESSURE: 95 MMHG | OXYGEN SATURATION: 100 % | BODY MASS INDEX: 29.82 KG/M2 | RESPIRATION RATE: 20 BRPM | HEART RATE: 96 BPM | HEIGHT: 67 IN | WEIGHT: 190 LBS

## 2022-02-10 VITALS
RESPIRATION RATE: 14 BRPM | DIASTOLIC BLOOD PRESSURE: 75 MMHG | SYSTOLIC BLOOD PRESSURE: 124 MMHG | OXYGEN SATURATION: 100 % | HEART RATE: 95 BPM | TEMPERATURE: 97.7 F

## 2022-02-10 VITALS
DIASTOLIC BLOOD PRESSURE: 77 MMHG | OXYGEN SATURATION: 100 % | SYSTOLIC BLOOD PRESSURE: 125 MMHG | RESPIRATION RATE: 16 BRPM | HEART RATE: 97 BPM

## 2022-02-10 DIAGNOSIS — R00.2 PALPITATIONS: Primary | ICD-10-CM

## 2022-02-10 DIAGNOSIS — R06.02 SOB (SHORTNESS OF BREATH): Primary | ICD-10-CM

## 2022-02-10 DIAGNOSIS — R11.0 NAUSEA WITHOUT VOMITING: ICD-10-CM

## 2022-02-10 LAB
ALBUMIN SERPL-MCNC: 4.4 G/DL (ref 3.5–5)
ALBUMIN/GLOB SERPL: 1 {RATIO} (ref 1.1–2.2)
ALP SERPL-CCNC: 76 U/L (ref 45–117)
ALT SERPL-CCNC: 16 U/L (ref 12–78)
ANION GAP SERPL CALC-SCNC: 9 MMOL/L (ref 5–15)
APPEARANCE UR: ABNORMAL
AST SERPL-CCNC: 10 U/L (ref 15–37)
ATRIAL RATE: 138 BPM
BACTERIA URNS QL MICRO: ABNORMAL /HPF
BASOPHILS # BLD: 0 K/UL (ref 0–0.1)
BASOPHILS NFR BLD: 0 % (ref 0–1)
BILIRUB SERPL-MCNC: 0.7 MG/DL (ref 0.2–1)
BILIRUB UR QL: NEGATIVE
BUN SERPL-MCNC: 6 MG/DL (ref 6–20)
BUN/CREAT SERPL: 6 (ref 12–20)
CALCIUM SERPL-MCNC: 9.9 MG/DL (ref 8.5–10.1)
CALCULATED P AXIS, ECG09: 73 DEGREES
CALCULATED R AXIS, ECG10: 17 DEGREES
CALCULATED T AXIS, ECG11: 32 DEGREES
CHLORIDE SERPL-SCNC: 107 MMOL/L (ref 97–108)
CO2 SERPL-SCNC: 21 MMOL/L (ref 21–32)
COLOR UR: ABNORMAL
COMMENT, HOLDF: NORMAL
CREAT SERPL-MCNC: 1.08 MG/DL (ref 0.55–1.02)
DIAGNOSIS, 93000: NORMAL
DIFFERENTIAL METHOD BLD: NORMAL
EOSINOPHIL # BLD: 0.1 K/UL (ref 0–0.4)
EOSINOPHIL NFR BLD: 1 % (ref 0–7)
EPITH CASTS URNS QL MICRO: ABNORMAL /LPF
ERYTHROCYTE [DISTWIDTH] IN BLOOD BY AUTOMATED COUNT: 12 % (ref 11.5–14.5)
GLOBULIN SER CALC-MCNC: 4.5 G/DL (ref 2–4)
GLUCOSE SERPL-MCNC: 122 MG/DL (ref 65–100)
GLUCOSE UR STRIP.AUTO-MCNC: NEGATIVE MG/DL
HCG UR QL: NEGATIVE
HCT VFR BLD AUTO: 42.3 % (ref 35–47)
HGB BLD-MCNC: 14.2 G/DL (ref 11.5–16)
HGB UR QL STRIP: NEGATIVE
IMM GRANULOCYTES # BLD AUTO: 0 K/UL (ref 0–0.04)
IMM GRANULOCYTES NFR BLD AUTO: 0 % (ref 0–0.5)
KETONES UR QL STRIP.AUTO: 15 MG/DL
LEUKOCYTE ESTERASE UR QL STRIP.AUTO: ABNORMAL
LYMPHOCYTES # BLD: 2.4 K/UL (ref 0.8–3.5)
LYMPHOCYTES NFR BLD: 39 % (ref 12–49)
MCH RBC QN AUTO: 30.3 PG (ref 26–34)
MCHC RBC AUTO-ENTMCNC: 33.6 G/DL (ref 30–36.5)
MCV RBC AUTO: 90.4 FL (ref 80–99)
MONOCYTES # BLD: 0.5 K/UL (ref 0–1)
MONOCYTES NFR BLD: 8 % (ref 5–13)
MUCOUS THREADS URNS QL MICRO: ABNORMAL /LPF
NEUTS SEG # BLD: 3.2 K/UL (ref 1.8–8)
NEUTS SEG NFR BLD: 52 % (ref 32–75)
NITRITE UR QL STRIP.AUTO: NEGATIVE
NRBC # BLD: 0 K/UL (ref 0–0.01)
NRBC BLD-RTO: 0 PER 100 WBC
P-R INTERVAL, ECG05: 120 MS
PH UR STRIP: 6 [PH] (ref 5–8)
PLATELET # BLD AUTO: 355 K/UL (ref 150–400)
PMV BLD AUTO: 10.9 FL (ref 8.9–12.9)
POTASSIUM SERPL-SCNC: 3.1 MMOL/L (ref 3.5–5.1)
PROT SERPL-MCNC: 8.9 G/DL (ref 6.4–8.2)
PROT UR STRIP-MCNC: NEGATIVE MG/DL
Q-T INTERVAL, ECG07: 292 MS
QRS DURATION, ECG06: 70 MS
QTC CALCULATION (BEZET), ECG08: 442 MS
RBC # BLD AUTO: 4.68 M/UL (ref 3.8–5.2)
RBC #/AREA URNS HPF: ABNORMAL /HPF (ref 0–5)
SAMPLES BEING HELD,HOLD: NORMAL
SODIUM SERPL-SCNC: 137 MMOL/L (ref 136–145)
SP GR UR REFRACTOMETRY: 1.02 (ref 1–1.03)
TROPONIN-HIGH SENSITIVITY: 4 NG/L (ref 0–51)
UR CULT HOLD, URHOLD: NORMAL
UROBILINOGEN UR QL STRIP.AUTO: 0.2 EU/DL (ref 0.2–1)
VENTRICULAR RATE, ECG03: 138 BPM
WBC # BLD AUTO: 6.1 K/UL (ref 3.6–11)
WBC URNS QL MICRO: ABNORMAL /HPF (ref 0–4)

## 2022-02-10 PROCEDURE — 96374 THER/PROPH/DIAG INJ IV PUSH: CPT

## 2022-02-10 PROCEDURE — 93005 ELECTROCARDIOGRAM TRACING: CPT

## 2022-02-10 PROCEDURE — 96375 TX/PRO/DX INJ NEW DRUG ADDON: CPT

## 2022-02-10 PROCEDURE — 96361 HYDRATE IV INFUSION ADD-ON: CPT

## 2022-02-10 PROCEDURE — 71275 CT ANGIOGRAPHY CHEST: CPT

## 2022-02-10 PROCEDURE — 74011250636 HC RX REV CODE- 250/636: Performed by: STUDENT IN AN ORGANIZED HEALTH CARE EDUCATION/TRAINING PROGRAM

## 2022-02-10 PROCEDURE — 74011250636 HC RX REV CODE- 250/636: Performed by: EMERGENCY MEDICINE

## 2022-02-10 PROCEDURE — 80053 COMPREHEN METABOLIC PANEL: CPT

## 2022-02-10 PROCEDURE — 36415 COLL VENOUS BLD VENIPUNCTURE: CPT

## 2022-02-10 PROCEDURE — 81025 URINE PREGNANCY TEST: CPT

## 2022-02-10 PROCEDURE — 81001 URINALYSIS AUTO W/SCOPE: CPT

## 2022-02-10 PROCEDURE — 74011000636 HC RX REV CODE- 636: Performed by: RADIOLOGY

## 2022-02-10 PROCEDURE — 85025 COMPLETE CBC W/AUTO DIFF WBC: CPT

## 2022-02-10 PROCEDURE — 99281 EMR DPT VST MAYX REQ PHY/QHP: CPT

## 2022-02-10 PROCEDURE — 99283 EMERGENCY DEPT VISIT LOW MDM: CPT

## 2022-02-10 PROCEDURE — 74011250636 HC RX REV CODE- 250/636

## 2022-02-10 PROCEDURE — 84484 ASSAY OF TROPONIN QUANT: CPT

## 2022-02-10 RX ORDER — SODIUM CHLORIDE 9 MG/ML
1000 INJECTION, SOLUTION INTRAVENOUS ONCE
Status: COMPLETED | OUTPATIENT
Start: 2022-02-10 | End: 2022-02-10

## 2022-02-10 RX ORDER — FAMOTIDINE 20 MG/1
20 TABLET, FILM COATED ORAL 2 TIMES DAILY
Qty: 20 TABLET | Refills: 0 | Status: SHIPPED | OUTPATIENT
Start: 2022-02-10 | End: 2022-02-20

## 2022-02-10 RX ORDER — ONDANSETRON 2 MG/ML
8 INJECTION INTRAMUSCULAR; INTRAVENOUS
Status: COMPLETED | OUTPATIENT
Start: 2022-02-10 | End: 2022-02-10

## 2022-02-10 RX ORDER — ONDANSETRON 2 MG/ML
4 INJECTION INTRAMUSCULAR; INTRAVENOUS
Status: COMPLETED | OUTPATIENT
Start: 2022-02-10 | End: 2022-02-10

## 2022-02-10 RX ORDER — ONDANSETRON 2 MG/ML
INJECTION INTRAMUSCULAR; INTRAVENOUS
Status: COMPLETED
Start: 2022-02-10 | End: 2022-02-10

## 2022-02-10 RX ORDER — HYDROXYZINE 50 MG/1
50 TABLET, FILM COATED ORAL
Qty: 20 TABLET | Refills: 0 | Status: SHIPPED | OUTPATIENT
Start: 2022-02-10 | End: 2022-02-20

## 2022-02-10 RX ORDER — PROMETHAZINE HYDROCHLORIDE 25 MG/1
25 TABLET ORAL
Qty: 12 TABLET | Refills: 0 | Status: SHIPPED | OUTPATIENT
Start: 2022-02-10

## 2022-02-10 RX ORDER — LORAZEPAM 2 MG/ML
0.5 INJECTION INTRAMUSCULAR ONCE
Status: COMPLETED | OUTPATIENT
Start: 2022-02-10 | End: 2022-02-10

## 2022-02-10 RX ORDER — LORAZEPAM 2 MG/ML
INJECTION INTRAMUSCULAR
Status: COMPLETED
Start: 2022-02-10 | End: 2022-02-10

## 2022-02-10 RX ADMIN — ONDANSETRON HYDROCHLORIDE 8 MG: 2 SOLUTION INTRAMUSCULAR; INTRAVENOUS at 07:07

## 2022-02-10 RX ADMIN — ONDANSETRON 8 MG: 2 INJECTION INTRAMUSCULAR; INTRAVENOUS at 07:07

## 2022-02-10 RX ADMIN — ONDANSETRON 4 MG: 2 INJECTION INTRAMUSCULAR; INTRAVENOUS at 20:54

## 2022-02-10 RX ADMIN — IOPAMIDOL 80 ML: 755 INJECTION, SOLUTION INTRAVENOUS at 08:26

## 2022-02-10 RX ADMIN — SODIUM CHLORIDE 1000 ML: 9 INJECTION, SOLUTION INTRAVENOUS at 07:08

## 2022-02-10 RX ADMIN — SODIUM CHLORIDE 1000 ML: 9 INJECTION, SOLUTION INTRAVENOUS at 20:53

## 2022-02-10 RX ADMIN — SODIUM CHLORIDE 1000 ML: 9 INJECTION, SOLUTION INTRAVENOUS at 11:18

## 2022-02-10 RX ADMIN — LORAZEPAM 0.5 MG: 2 INJECTION INTRAMUSCULAR at 07:01

## 2022-02-10 RX ADMIN — LORAZEPAM 0.5 MG: 2 INJECTION INTRAMUSCULAR; INTRAVENOUS at 07:01

## 2022-02-10 NOTE — ED TRIAGE NOTES
23year old female pt comes to the ED for a CC Chest pain, Right leg pain, and back pain. Pt states that she woke up a few hours ago feeling that she has heart palpations. Pt states that she also has pain in her right leg since Sunday. Pt rates her back as a pain scale of 5 out of 10. Pt is very anxious. Pt is A&Ox4.

## 2022-02-10 NOTE — ED TRIAGE NOTES
Pt arrives to the ER for complaints of midsternal chest pain/burning and shortness of breath and \"thumps\" in chest.     Pt reports that around 0300 she started to have these symptoms and then drove to [facility]. Pt was ruled out for a blood clot and was discharged this morning. Pt able to talk in full sentences without distress. Pt does appear to be anxious. PMH of anxiety but states that this \"feels different\" Does also have a history of PE about a year ago when she was pregnant.
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
## 2022-02-10 NOTE — ED NOTES
Patient (s)  given copy of dc instructions. Patient (s)  verbalized understanding of instructions. Patient given a current medication reconciliation form and verbalized understanding of their medications. Patient (s) verbalized understanding of the importance of discussing medications with  his or her physician or clinic they will be following up with. Patient alert and oriented and in no acute distress. Patient discharged home ambulatory with all belongings.

## 2022-02-10 NOTE — ED NOTES
8:01 AM  Change of shift. Care of patient taken over from Dr. Darwin Gamboa; H&P reviewed, bedside handoff complete. Awaiting CT scan. CT scan witih no aute process. I went over results with patient. She says she has also been having palpitations. Per review of records she has been evaluated by cardiology in past for paroxysmal SVT with plan for follow up with them as needed. The patient has been re-evaluated and feeling much better and are stable for discharge. All available radiology and laboratory results have been reviewed with patient and/or available family. Patient and/or family verbally conveyed their understanding and agreement of the patient's signs, symptoms, diagnosis, treatment and prognosis and additionally agree to follow-up as recommended in the discharge instructions or to return to the Emergency Department should their condition change or worsen prior to their follow-up appointment. All questions have been answered and patient and/or available family express understanding. LABORATORY RESULTS:  Labs Reviewed   METABOLIC PANEL, COMPREHENSIVE - Abnormal; Notable for the following components:       Result Value    Potassium 3.1 (*)     Glucose 122 (*)     Creatinine 1.08 (*)     BUN/Creatinine ratio 6 (*)     AST (SGOT) 10 (*)     Protein, total 8.9 (*)     Globulin 4.5 (*)     A-G Ratio 1.0 (*)     All other components within normal limits   URINALYSIS W/MICROSCOPIC - Abnormal; Notable for the following components:    Appearance CLOUDY (*)     Ketone 15 (*)     Leukocyte Esterase TRACE (*)     Epithelial cells MODERATE (*)     Bacteria 1+ (*)     Mucus TRACE (*)     All other components within normal limits   URINE CULTURE HOLD SAMPLE   TROPONIN-HIGH SENSITIVITY   CBC WITH AUTOMATED DIFF   SAMPLES BEING HELD   HCG URINE, QL. - POC       IMAGING RESULTS:  CTA CHEST W OR W WO CONT   Final Result   No pulmonary emboli identified. No acute abnormality identified.  4   mm nodule right middle lobe is unchanged. Cloteal Herman MEDICATIONS GIVEN:  Medications   iopamidoL (ISOVUE-370) 76 % injection 100 mL (80 mL IntraVENous Given 2/10/22 0826)   ondansetron (ZOFRAN) injection 8 mg (8 mg IntraVENous Given 2/10/22 0707)   sodium chloride 0.9 % bolus infusion 1,000 mL (0 mL IntraVENous IV Completed 2/10/22 0808)   LORazepam (ATIVAN) injection 0.5 mg (0.5 mg IntraVENous Given 2/10/22 0701)   0.9% sodium chloride infusion 1,000 mL (0 mL IntraVENous IV Completed 2/10/22 1217)       IMPRESSION:  1. SOB (shortness of breath)        PLAN:  Follow-up Information     Follow up With Specialties Details Why Contact Info    Nahid Galvez PA-C Physician Assistant Schedule an appointment as soon as possible for a visit in 2 days  [address] [place] [phone], [facility] Emergency Medicine Go to  If symptoms worsen [address] [facility]    Aliya Humphrey  [facility] Vascular Surgery, Interventional Cardiology, Cardiology   [address] [facility]  [address] [place] [address]  [phone]           Discharge Medication List as of 2/10/2022 10:39 AM            Gómez Kaba MD        Please note that this dictation was completed with EverTrue, the computer voice recognition software. Quite often unanticipated grammatical, syntax, homophones, and other interpretive errors are inadvertently transcribed by the computer software. Please disregard these errors. Please excuse any errors that have escaped final proofreading.
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
## 2022-02-10 NOTE — ED NOTES
Verbal shift change report given to Harris Roman RN (oncoming nurse) by Carey Gómez RN (offgoing nurse). Report included the following information SBAR, ED Summary, Intake/Output, MAR and Recent Results.

## 2022-02-10 NOTE — DISCHARGE INSTRUCTIONS
Your work-up has been reassuring without concerning findings on your labs, imaging, or EKG. Most palpitations are benign. Thank you for allowing us to provide you with medical care today. We realize that you have many choices for your emergency care needs. We thank you for choosing [facility]. Please choose us in the future for any continued health care needs. We hope we addressed all of your medical concerns. We strive to provide excellent quality care in the Emergency Department. Anything less than excellent does not meet our expectations. The exam and treatment you received in the Emergency Department were for an emergent problem and are not intended as complete care. It is important that you follow up with a doctor, nurse practitioner, or physician's assistant for ongoing care. If your symptoms worsen or you do not improve as expected and you are unable to reach your usual health care provider, you should return to the Emergency Department. We are available 24 hours a day. Take this sheet with you when you go to your follow-up visit. If you have any problem arranging the follow-up visit, contact the Emergency Department immediately. Make an appointment your family doctor for follow up of this visit. Return to the ER if you are unable to be seen in a timely manner.

## 2022-02-11 LAB
ATRIAL RATE: 88 BPM
CALCULATED P AXIS, ECG09: 60 DEGREES
CALCULATED R AXIS, ECG10: 25 DEGREES
CALCULATED T AXIS, ECG11: 32 DEGREES
DIAGNOSIS, 93000: NORMAL
P-R INTERVAL, ECG05: 146 MS
Q-T INTERVAL, ECG07: 386 MS
QRS DURATION, ECG06: 84 MS
QTC CALCULATION (BEZET), ECG08: 467 MS
VENTRICULAR RATE, ECG03: 88 BPM

## 2022-02-11 NOTE — ED TRIAGE NOTES
Pt arrives via ER front doors for c/o nausea, heart palpitations, cp, and n/v. Reports that she was just discharged from ER 15 minutes ago. Pt with pressured speech in triage. States she is dehydrated and received fluids at [facility] this am and still feels terrible. Reports being tested for covid x3 - all negative. She is vaccinated and boostered.
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
## 2022-03-19 PROBLEM — O46.93 VAGINAL BLEEDING IN PREGNANCY, THIRD TRIMESTER: Status: ACTIVE | Noted: 2021-02-09

## 2022-03-19 PROBLEM — H43.392 FLOATERS IN VISUAL FIELD, LEFT: Status: ACTIVE | Noted: 2020-12-20

## 2022-03-19 PROBLEM — Z3A.28 28 WEEKS GESTATION OF PREGNANCY: Status: ACTIVE | Noted: 2020-12-19

## 2022-03-19 PROBLEM — R51.9 ACUTE HEADACHE: Status: ACTIVE | Noted: 2020-12-20

## 2022-03-19 PROBLEM — I47.1 SVT (SUPRAVENTRICULAR TACHYCARDIA) (HCC): Status: ACTIVE | Noted: 2021-02-11

## 2022-03-20 PROBLEM — R06.00 DYSPNEA: Status: ACTIVE | Noted: 2021-02-11

## 2022-03-20 PROBLEM — Z3A.36 36 WEEKS GESTATION OF PREGNANCY: Status: ACTIVE | Noted: 2021-02-11

## 2022-12-04 ENCOUNTER — APPOINTMENT (OUTPATIENT)
Dept: ULTRASOUND IMAGING | Age: 27
End: 2022-12-04
Attending: EMERGENCY MEDICINE
Payer: MEDICAID

## 2022-12-04 ENCOUNTER — HOSPITAL ENCOUNTER (EMERGENCY)
Age: 27
Discharge: HOME | End: 2022-12-04
Attending: EMERGENCY MEDICINE
Payer: MEDICAID

## 2022-12-04 VITALS
SYSTOLIC BLOOD PRESSURE: 123 MMHG | WEIGHT: 208 LBS | OXYGEN SATURATION: 100 % | BODY MASS INDEX: 31.52 KG/M2 | HEART RATE: 73 BPM | RESPIRATION RATE: 18 BRPM | TEMPERATURE: 98.6 F | DIASTOLIC BLOOD PRESSURE: 79 MMHG | HEIGHT: 68 IN

## 2022-12-04 DIAGNOSIS — O20.9 VAGINAL BLEEDING AFFECTING EARLY PREGNANCY: Primary | ICD-10-CM

## 2022-12-04 LAB
ABO + RH BLD: NORMAL
ALBUMIN SERPL-MCNC: 3.9 G/DL (ref 3.5–5)
ALBUMIN/GLOB SERPL: 0.9 {RATIO} (ref 1.1–2.2)
ALP SERPL-CCNC: 53 U/L (ref 45–117)
ALT SERPL-CCNC: 23 U/L (ref 12–78)
ANION GAP SERPL CALC-SCNC: 5 MMOL/L (ref 5–15)
AST SERPL-CCNC: 13 U/L (ref 15–37)
BASOPHILS # BLD: 0 K/UL (ref 0–0.1)
BASOPHILS NFR BLD: 0 % (ref 0–1)
BILIRUB SERPL-MCNC: 0.4 MG/DL (ref 0.2–1)
BUN SERPL-MCNC: 10 MG/DL (ref 6–20)
BUN/CREAT SERPL: 13 (ref 12–20)
CALCIUM SERPL-MCNC: 9.1 MG/DL (ref 8.5–10.1)
CHLORIDE SERPL-SCNC: 105 MMOL/L (ref 97–108)
CO2 SERPL-SCNC: 26 MMOL/L (ref 21–32)
COMMENT, HOLDF: NORMAL
CREAT SERPL-MCNC: 0.8 MG/DL (ref 0.55–1.02)
DIFFERENTIAL METHOD BLD: ABNORMAL
EOSINOPHIL # BLD: 0.1 K/UL (ref 0–0.4)
EOSINOPHIL NFR BLD: 1 % (ref 0–7)
ERYTHROCYTE [DISTWIDTH] IN BLOOD BY AUTOMATED COUNT: 12.2 % (ref 11.5–14.5)
GLOBULIN SER CALC-MCNC: 4.3 G/DL (ref 2–4)
GLUCOSE SERPL-MCNC: 100 MG/DL (ref 65–100)
HCG SERPL-ACNC: 6290 MIU/ML (ref 0–6)
HCT VFR BLD AUTO: 40.2 % (ref 35–47)
HGB BLD-MCNC: 13.4 G/DL (ref 11.5–16)
IMM GRANULOCYTES # BLD AUTO: 0.1 K/UL (ref 0–0.04)
IMM GRANULOCYTES NFR BLD AUTO: 1 % (ref 0–0.5)
LYMPHOCYTES # BLD: 2.2 K/UL (ref 0.8–3.5)
LYMPHOCYTES NFR BLD: 21 % (ref 12–49)
MCH RBC QN AUTO: 30.4 PG (ref 26–34)
MCHC RBC AUTO-ENTMCNC: 33.3 G/DL (ref 30–36.5)
MCV RBC AUTO: 91.2 FL (ref 80–99)
MONOCYTES # BLD: 0.5 K/UL (ref 0–1)
MONOCYTES NFR BLD: 5 % (ref 5–13)
NEUTS SEG # BLD: 7.6 K/UL (ref 1.8–8)
NEUTS SEG NFR BLD: 72 % (ref 32–75)
NRBC # BLD: 0 K/UL (ref 0–0.01)
NRBC BLD-RTO: 0 PER 100 WBC
PLATELET # BLD AUTO: 269 K/UL (ref 150–400)
PMV BLD AUTO: 10.8 FL (ref 8.9–12.9)
POTASSIUM SERPL-SCNC: 3.6 MMOL/L (ref 3.5–5.1)
PROT SERPL-MCNC: 8.2 G/DL (ref 6.4–8.2)
RBC # BLD AUTO: 4.41 M/UL (ref 3.8–5.2)
SAMPLES BEING HELD,HOLD: NORMAL
SODIUM SERPL-SCNC: 136 MMOL/L (ref 136–145)
WBC # BLD AUTO: 10.5 K/UL (ref 3.6–11)

## 2022-12-04 PROCEDURE — 85025 COMPLETE CBC W/AUTO DIFF WBC: CPT

## 2022-12-04 PROCEDURE — 99284 EMERGENCY DEPT VISIT MOD MDM: CPT

## 2022-12-04 PROCEDURE — 76817 TRANSVAGINAL US OBSTETRIC: CPT

## 2022-12-04 PROCEDURE — 84702 CHORIONIC GONADOTROPIN TEST: CPT

## 2022-12-04 PROCEDURE — 86900 BLOOD TYPING SEROLOGIC ABO: CPT

## 2022-12-04 PROCEDURE — 36415 COLL VENOUS BLD VENIPUNCTURE: CPT

## 2022-12-04 PROCEDURE — 80053 COMPREHEN METABOLIC PANEL: CPT

## 2022-12-05 NOTE — ED TRIAGE NOTES
Patient arrives with complaints of bleeding and concern for miscarriage     Patient unsure of how far along she is, last period was sometime in October
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
## 2022-12-26 ENCOUNTER — HOSPITAL ENCOUNTER (EMERGENCY)
Age: 27
Discharge: HOME | End: 2022-12-27
Attending: STUDENT IN AN ORGANIZED HEALTH CARE EDUCATION/TRAINING PROGRAM
Payer: MEDICAID

## 2022-12-26 ENCOUNTER — APPOINTMENT (OUTPATIENT)
Dept: ULTRASOUND IMAGING | Age: 27
End: 2022-12-26
Attending: STUDENT IN AN ORGANIZED HEALTH CARE EDUCATION/TRAINING PROGRAM
Payer: MEDICAID

## 2022-12-26 VITALS
OXYGEN SATURATION: 100 % | WEIGHT: 204 LBS | BODY MASS INDEX: 30.92 KG/M2 | DIASTOLIC BLOOD PRESSURE: 87 MMHG | HEART RATE: 96 BPM | RESPIRATION RATE: 20 BRPM | HEIGHT: 68 IN | SYSTOLIC BLOOD PRESSURE: 129 MMHG | TEMPERATURE: 97.9 F

## 2022-12-26 DIAGNOSIS — O20.0 THREATENED MISCARRIAGE: Primary | ICD-10-CM

## 2022-12-26 LAB
ABO + RH BLD: NORMAL
ANION GAP SERPL CALC-SCNC: 8 MMOL/L (ref 5–15)
BASOPHILS # BLD: 0 K/UL (ref 0–0.1)
BASOPHILS NFR BLD: 0 % (ref 0–1)
BLOOD GROUP ANTIBODIES SERPL: NORMAL
BUN SERPL-MCNC: 15 MG/DL (ref 6–20)
BUN/CREAT SERPL: 19 (ref 12–20)
CALCIUM SERPL-MCNC: 9.4 MG/DL (ref 8.5–10.1)
CHLORIDE SERPL-SCNC: 104 MMOL/L (ref 97–108)
CO2 SERPL-SCNC: 25 MMOL/L (ref 21–32)
CREAT SERPL-MCNC: 0.81 MG/DL (ref 0.55–1.02)
DIFFERENTIAL METHOD BLD: ABNORMAL
EOSINOPHIL # BLD: 0.1 K/UL (ref 0–0.4)
EOSINOPHIL NFR BLD: 1 % (ref 0–7)
ERYTHROCYTE [DISTWIDTH] IN BLOOD BY AUTOMATED COUNT: 12.5 % (ref 11.5–14.5)
GLUCOSE SERPL-MCNC: 120 MG/DL (ref 65–100)
HCT VFR BLD AUTO: 37.9 % (ref 35–47)
HGB BLD-MCNC: 12.8 G/DL (ref 11.5–16)
IMM GRANULOCYTES # BLD AUTO: 0 K/UL (ref 0–0.04)
IMM GRANULOCYTES NFR BLD AUTO: 0 % (ref 0–0.5)
LYMPHOCYTES # BLD: 2.5 K/UL (ref 0.8–3.5)
LYMPHOCYTES NFR BLD: 22 % (ref 12–49)
MCH RBC QN AUTO: 30.6 PG (ref 26–34)
MCHC RBC AUTO-ENTMCNC: 33.8 G/DL (ref 30–36.5)
MCV RBC AUTO: 90.7 FL (ref 80–99)
MONOCYTES # BLD: 0.7 K/UL (ref 0–1)
MONOCYTES NFR BLD: 6 % (ref 5–13)
NEUTS SEG # BLD: 7.9 K/UL (ref 1.8–8)
NEUTS SEG NFR BLD: 71 % (ref 32–75)
NRBC # BLD: 0 K/UL (ref 0–0.01)
NRBC BLD-RTO: 0 PER 100 WBC
PLATELET # BLD AUTO: 349 K/UL (ref 150–400)
PMV BLD AUTO: 10.4 FL (ref 8.9–12.9)
POTASSIUM SERPL-SCNC: 3.7 MMOL/L (ref 3.5–5.1)
RBC # BLD AUTO: 4.18 M/UL (ref 3.8–5.2)
SODIUM SERPL-SCNC: 137 MMOL/L (ref 136–145)
SPECIMEN EXP DATE BLD: NORMAL
WBC # BLD AUTO: 11.3 K/UL (ref 3.6–11)

## 2022-12-26 PROCEDURE — 99284 EMERGENCY DEPT VISIT MOD MDM: CPT

## 2022-12-26 PROCEDURE — 36415 COLL VENOUS BLD VENIPUNCTURE: CPT

## 2022-12-26 PROCEDURE — 74011250636 HC RX REV CODE- 250/636: Performed by: STUDENT IN AN ORGANIZED HEALTH CARE EDUCATION/TRAINING PROGRAM

## 2022-12-26 PROCEDURE — 86900 BLOOD TYPING SEROLOGIC ABO: CPT

## 2022-12-26 PROCEDURE — 76817 TRANSVAGINAL US OBSTETRIC: CPT

## 2022-12-26 PROCEDURE — 74011250637 HC RX REV CODE- 250/637: Performed by: STUDENT IN AN ORGANIZED HEALTH CARE EDUCATION/TRAINING PROGRAM

## 2022-12-26 PROCEDURE — 85025 COMPLETE CBC W/AUTO DIFF WBC: CPT

## 2022-12-26 PROCEDURE — 80048 BASIC METABOLIC PNL TOTAL CA: CPT

## 2022-12-26 PROCEDURE — 84702 CHORIONIC GONADOTROPIN TEST: CPT

## 2022-12-26 PROCEDURE — 96360 HYDRATION IV INFUSION INIT: CPT

## 2022-12-26 RX ORDER — ACETAMINOPHEN 500 MG
1000 TABLET ORAL ONCE
Status: DISCONTINUED | OUTPATIENT
Start: 2022-12-26 | End: 2022-12-27 | Stop reason: HOSPADM

## 2022-12-26 RX ORDER — ONDANSETRON 2 MG/ML
4 INJECTION INTRAMUSCULAR; INTRAVENOUS ONCE
Status: DISCONTINUED | OUTPATIENT
Start: 2022-12-26 | End: 2022-12-27 | Stop reason: HOSPADM

## 2022-12-26 RX ADMIN — SODIUM CHLORIDE 1000 ML: 9 INJECTION, SOLUTION INTRAVENOUS at 23:12

## 2022-12-27 LAB — HCG SERPL-ACNC: ABNORMAL MIU/ML (ref 0–6)

## 2022-12-27 NOTE — ED TRIAGE NOTES
Pt to triage in wheelchair w/ c/o \"contractions\" that started about one hour ago. Pt is 9 weeks pregnant and reports vaginal bleeding since the start of her pregnancy.
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
## 2023-02-27 ENCOUNTER — HOSPITAL ENCOUNTER (INPATIENT)
Age: 28
LOS: 2 days | Discharge: HOME | DRG: 566 | End: 2023-03-01
Attending: STUDENT IN AN ORGANIZED HEALTH CARE EDUCATION/TRAINING PROGRAM | Admitting: HOSPITALIST
Payer: MEDICAID

## 2023-02-27 DIAGNOSIS — O23.42 UTI IN PREGNANCY, SECOND TRIMESTER: ICD-10-CM

## 2023-02-27 DIAGNOSIS — R50.9 ACUTE FEBRILE ILLNESS: Primary | ICD-10-CM

## 2023-02-27 PROBLEM — A08.4 VIRAL GASTROENTERITIS: Status: ACTIVE | Noted: 2023-02-27

## 2023-02-27 PROBLEM — N39.0 UTI (URINARY TRACT INFECTION): Status: ACTIVE | Noted: 2023-02-27

## 2023-02-27 PROBLEM — O23.40 UTI (URINARY TRACT INFECTION) DURING PREGNANCY: Status: ACTIVE | Noted: 2023-02-27

## 2023-02-27 LAB
ALBUMIN SERPL-MCNC: 2.9 G/DL (ref 3.5–5)
ALBUMIN/GLOB SERPL: 0.7 (ref 1.1–2.2)
ALP SERPL-CCNC: 46 U/L (ref 45–117)
ALT SERPL-CCNC: 16 U/L (ref 12–78)
ANION GAP SERPL CALC-SCNC: 7 MMOL/L (ref 5–15)
APPEARANCE UR: ABNORMAL
AST SERPL-CCNC: 10 U/L (ref 15–37)
BACTERIA URNS QL MICRO: ABNORMAL /HPF
BASOPHILS # BLD: 0 K/UL (ref 0–0.1)
BASOPHILS NFR BLD: 0 % (ref 0–1)
BILIRUB SERPL-MCNC: 0.4 MG/DL (ref 0.2–1)
BILIRUB UR QL: NEGATIVE
BUN SERPL-MCNC: 7 MG/DL (ref 6–20)
BUN/CREAT SERPL: 10 (ref 12–20)
CALCIUM SERPL-MCNC: 8.8 MG/DL (ref 8.5–10.1)
CHLORIDE SERPL-SCNC: 104 MMOL/L (ref 97–108)
CO2 SERPL-SCNC: 24 MMOL/L (ref 21–32)
COLOR UR: ABNORMAL
CREAT SERPL-MCNC: 0.67 MG/DL (ref 0.55–1.02)
DIFFERENTIAL METHOD BLD: ABNORMAL
EOSINOPHIL # BLD: 0 K/UL (ref 0–0.4)
EOSINOPHIL NFR BLD: 0 % (ref 0–7)
EPITH CASTS URNS QL MICRO: ABNORMAL /LPF
ERYTHROCYTE [DISTWIDTH] IN BLOOD BY AUTOMATED COUNT: 12.2 % (ref 11.5–14.5)
FLUAV AG NPH QL IA: NEGATIVE
FLUBV AG NOSE QL IA: NEGATIVE
GLOBULIN SER CALC-MCNC: 4.4 G/DL (ref 2–4)
GLUCOSE SERPL-MCNC: 121 MG/DL (ref 65–100)
GLUCOSE UR STRIP.AUTO-MCNC: NEGATIVE MG/DL
HCT VFR BLD AUTO: 35.8 % (ref 35–47)
HGB BLD-MCNC: 12.1 G/DL (ref 11.5–16)
HGB UR QL STRIP: NEGATIVE
IMM GRANULOCYTES # BLD AUTO: 0 K/UL (ref 0–0.04)
IMM GRANULOCYTES NFR BLD AUTO: 0 % (ref 0–0.5)
KETONES UR QL STRIP.AUTO: >80 MG/DL
LACTATE SERPL-SCNC: 1 MMOL/L (ref 0.4–2)
LEUKOCYTE ESTERASE UR QL STRIP.AUTO: ABNORMAL
LIPASE SERPL-CCNC: 31 U/L (ref 73–393)
LYMPHOCYTES # BLD: 0.5 K/UL (ref 0.8–3.5)
LYMPHOCYTES NFR BLD: 5 % (ref 12–49)
MCH RBC QN AUTO: 30.6 PG (ref 26–34)
MCHC RBC AUTO-ENTMCNC: 33.8 G/DL (ref 30–36.5)
MCV RBC AUTO: 90.6 FL (ref 80–99)
MONOCYTES # BLD: 0.4 K/UL (ref 0–1)
MONOCYTES NFR BLD: 4 % (ref 5–13)
NEUTS SEG # BLD: 8.5 K/UL (ref 1.8–8)
NEUTS SEG NFR BLD: 91 % (ref 32–75)
NITRITE UR QL STRIP.AUTO: NEGATIVE
NRBC # BLD: 0 K/UL (ref 0–0.01)
NRBC BLD-RTO: 0 PER 100 WBC
PH UR STRIP: 6.5 (ref 5–8)
PLATELET # BLD AUTO: 269 K/UL (ref 150–400)
PLATELET COMMENTS,PCOM: ABNORMAL
PMV BLD AUTO: 10.9 FL (ref 8.9–12.9)
POTASSIUM SERPL-SCNC: 3.6 MMOL/L (ref 3.5–5.1)
PROT SERPL-MCNC: 7.3 G/DL (ref 6.4–8.2)
PROT UR STRIP-MCNC: NEGATIVE MG/DL
RBC # BLD AUTO: 3.95 M/UL (ref 3.8–5.2)
RBC #/AREA URNS HPF: ABNORMAL /HPF (ref 0–5)
RBC MORPH BLD: ABNORMAL
SARS-COV-2 RDRP RESP QL NAA+PROBE: NOT DETECTED
SODIUM SERPL-SCNC: 135 MMOL/L (ref 136–145)
SOURCE, COVRS: NORMAL
SP GR UR REFRACTOMETRY: 1.02 (ref 1–1.03)
UR CULT HOLD, URHOLD: NORMAL
UROBILINOGEN UR QL STRIP.AUTO: 0.2 EU/DL (ref 0.2–1)
WBC # BLD AUTO: 9.4 K/UL (ref 3.6–11)
WBC URNS QL MICRO: ABNORMAL /HPF (ref 0–4)

## 2023-02-27 PROCEDURE — 83605 ASSAY OF LACTIC ACID: CPT

## 2023-02-27 PROCEDURE — 81001 URINALYSIS AUTO W/SCOPE: CPT

## 2023-02-27 PROCEDURE — 96374 THER/PROPH/DIAG INJ IV PUSH: CPT

## 2023-02-27 PROCEDURE — 96376 TX/PRO/DX INJ SAME DRUG ADON: CPT

## 2023-02-27 PROCEDURE — 74011250636 HC RX REV CODE- 250/636: Performed by: PHYSICIAN ASSISTANT

## 2023-02-27 PROCEDURE — 85025 COMPLETE CBC W/AUTO DIFF WBC: CPT

## 2023-02-27 PROCEDURE — 87040 BLOOD CULTURE FOR BACTERIA: CPT

## 2023-02-27 PROCEDURE — 96375 TX/PRO/DX INJ NEW DRUG ADDON: CPT

## 2023-02-27 PROCEDURE — 74011250637 HC RX REV CODE- 250/637: Performed by: PHYSICIAN ASSISTANT

## 2023-02-27 PROCEDURE — 74011250637 HC RX REV CODE- 250/637: Performed by: HOSPITALIST

## 2023-02-27 PROCEDURE — 87635 SARS-COV-2 COVID-19 AMP PRB: CPT

## 2023-02-27 PROCEDURE — 36415 COLL VENOUS BLD VENIPUNCTURE: CPT

## 2023-02-27 PROCEDURE — 96361 HYDRATE IV INFUSION ADD-ON: CPT

## 2023-02-27 PROCEDURE — 87804 INFLUENZA ASSAY W/OPTIC: CPT

## 2023-02-27 PROCEDURE — 74011250636 HC RX REV CODE- 250/636: Performed by: HOSPITALIST

## 2023-02-27 PROCEDURE — 74011000250 HC RX REV CODE- 250: Performed by: PHYSICIAN ASSISTANT

## 2023-02-27 PROCEDURE — 65270000029 HC RM PRIVATE

## 2023-02-27 PROCEDURE — 83690 ASSAY OF LIPASE: CPT

## 2023-02-27 PROCEDURE — 80053 COMPREHEN METABOLIC PANEL: CPT

## 2023-02-27 PROCEDURE — 87086 URINE CULTURE/COLONY COUNT: CPT

## 2023-02-27 PROCEDURE — 99285 EMERGENCY DEPT VISIT HI MDM: CPT

## 2023-02-27 RX ORDER — ACETAMINOPHEN 500 MG
1000 TABLET ORAL ONCE
Status: COMPLETED | OUTPATIENT
Start: 2023-02-27 | End: 2023-02-27

## 2023-02-27 RX ORDER — ONDANSETRON 2 MG/ML
4 INJECTION INTRAMUSCULAR; INTRAVENOUS
Status: COMPLETED | OUTPATIENT
Start: 2023-02-27 | End: 2023-02-27

## 2023-02-27 RX ORDER — SODIUM CHLORIDE 0.9 % (FLUSH) 0.9 %
5-40 SYRINGE (ML) INJECTION AS NEEDED
Status: DISCONTINUED | OUTPATIENT
Start: 2023-02-27 | End: 2023-03-01 | Stop reason: HOSPADM

## 2023-02-27 RX ORDER — DIPHENHYDRAMINE HYDROCHLORIDE 50 MG/ML
25 INJECTION, SOLUTION INTRAMUSCULAR; INTRAVENOUS
Status: COMPLETED | OUTPATIENT
Start: 2023-02-27 | End: 2023-02-27

## 2023-02-27 RX ORDER — SODIUM CHLORIDE, SODIUM LACTATE, POTASSIUM CHLORIDE, CALCIUM CHLORIDE 600; 310; 30; 20 MG/100ML; MG/100ML; MG/100ML; MG/100ML
1000 INJECTION, SOLUTION INTRAVENOUS ONCE
Status: COMPLETED | OUTPATIENT
Start: 2023-02-27 | End: 2023-02-27

## 2023-02-27 RX ORDER — SODIUM CHLORIDE 0.9 % (FLUSH) 0.9 %
5-40 SYRINGE (ML) INJECTION EVERY 8 HOURS
Status: DISCONTINUED | OUTPATIENT
Start: 2023-02-27 | End: 2023-03-01 | Stop reason: HOSPADM

## 2023-02-27 RX ORDER — SODIUM CHLORIDE 9 MG/ML
75 INJECTION, SOLUTION INTRAVENOUS CONTINUOUS
Status: DISCONTINUED | OUTPATIENT
Start: 2023-02-27 | End: 2023-03-01 | Stop reason: HOSPADM

## 2023-02-27 RX ORDER — POLYETHYLENE GLYCOL 3350 17 G/17G
17 POWDER, FOR SOLUTION ORAL DAILY PRN
Status: DISCONTINUED | OUTPATIENT
Start: 2023-02-27 | End: 2023-03-01 | Stop reason: HOSPADM

## 2023-02-27 RX ORDER — ONDANSETRON 2 MG/ML
4 INJECTION INTRAMUSCULAR; INTRAVENOUS
Status: DISCONTINUED | OUTPATIENT
Start: 2023-02-27 | End: 2023-03-01 | Stop reason: HOSPADM

## 2023-02-27 RX ORDER — ACETAMINOPHEN 650 MG/1
650 SUPPOSITORY RECTAL
Status: DISCONTINUED | OUTPATIENT
Start: 2023-02-27 | End: 2023-03-01 | Stop reason: HOSPADM

## 2023-02-27 RX ORDER — PROMETHAZINE HYDROCHLORIDE 25 MG/1
25 TABLET ORAL
Status: DISCONTINUED | OUTPATIENT
Start: 2023-02-27 | End: 2023-03-01 | Stop reason: HOSPADM

## 2023-02-27 RX ORDER — ONDANSETRON 4 MG/1
4 TABLET, ORALLY DISINTEGRATING ORAL
Status: DISCONTINUED | OUTPATIENT
Start: 2023-02-27 | End: 2023-03-01 | Stop reason: HOSPADM

## 2023-02-27 RX ORDER — ACETAMINOPHEN 325 MG/1
650 TABLET ORAL
Status: DISCONTINUED | OUTPATIENT
Start: 2023-02-27 | End: 2023-03-01 | Stop reason: HOSPADM

## 2023-02-27 RX ADMIN — DIPHENHYDRAMINE HYDROCHLORIDE 25 MG: 50 INJECTION, SOLUTION INTRAMUSCULAR; INTRAVENOUS at 15:31

## 2023-02-27 RX ADMIN — ONDANSETRON 4 MG: 2 INJECTION INTRAMUSCULAR; INTRAVENOUS at 09:36

## 2023-02-27 RX ADMIN — ACETAMINOPHEN 650 MG: 325 TABLET ORAL at 22:34

## 2023-02-27 RX ADMIN — ONDANSETRON 4 MG: 2 INJECTION INTRAMUSCULAR; INTRAVENOUS at 11:38

## 2023-02-27 RX ADMIN — ONDANSETRON 4 MG: 2 INJECTION INTRAMUSCULAR; INTRAVENOUS at 22:34

## 2023-02-27 RX ADMIN — SODIUM CHLORIDE 75 ML/HR: 9 INJECTION, SOLUTION INTRAVENOUS at 22:32

## 2023-02-27 RX ADMIN — SODIUM CHLORIDE 1000 ML: 9 INJECTION, SOLUTION INTRAVENOUS at 11:43

## 2023-02-27 RX ADMIN — CEFAZOLIN 2 G: 1 INJECTION, POWDER, FOR SOLUTION INTRAMUSCULAR; INTRAVENOUS at 17:49

## 2023-02-27 RX ADMIN — ACETAMINOPHEN 1000 MG: 500 TABLET ORAL at 15:31

## 2023-02-27 RX ADMIN — SODIUM CHLORIDE, POTASSIUM CHLORIDE, SODIUM LACTATE AND CALCIUM CHLORIDE 1000 ML: 600; 310; 30; 20 INJECTION, SOLUTION INTRAVENOUS at 09:36
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
## 2023-02-27 NOTE — PROGRESS NOTES
Spiritual Care Assessment/Progress Note  [address]      NAME: Karri Almanzar      MRN: [id number]  AGE: 33 y.o.  SEX: female  Orthodox Affiliation: Jehovah's witness   Language: English     2/27/2023     Total Time (in minutes): 17     Spiritual Assessment begun in [facility] EMERGENCY DEPT through conversation with:         []Patient        [] Family    [] Friend(s)        Reason for Consult: Emergency Department visit     Spiritual beliefs: (Please include comment if needed)     [] Identifies with a matthew tradition:         [] Supported by a matthew community:            [] Claims no spiritual orientation:           [] Seeking spiritual identity:                [] Adheres to an individual form of spirituality:           [x] Not able to assess:                           Identified resources for coping:      [] Prayer                               [] Music                  [] Guided Imagery     [] Family/friends                 [] Pet visits     [] Devotional reading                         [x] Unknown     [] Other:                                               Interventions offered during this visit: (See comments for more details)    Patient Interventions: Affirmation of emotions/emotional suffering, Coping skills reviewed/reinforced, Normalization of emotional/spiritual concerns           Plan of Care:     [] Support spiritual and/or cultural needs    [] Support AMD and/or advance care planning process      [] Support grieving process   [] Coordinate Rites and/or Rituals    [] Coordination with community clergy   [] No spiritual needs identified at this time   [] Detailed Plan of Care below (See Comments)  [] Make referral to Music Therapy  [] Make referral to Pet Therapy     [] Make referral to Addiction services  [] Make referral to [facility]  [] Make referral to Spiritual Care Partner  [] No future visits requested        [] Contact Spiritual Care for further referrals     Comments: Rounding in Emergency Room: No spiritual needs noted. Provided words of comfort and support, ministry of presence, empathic listening, hospitality and Intro to spiritual care. [address]  [place]., [facility]   paging Service [phone] ([phone])
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
## 2023-02-28 LAB
ANION GAP SERPL CALC-SCNC: 5 MMOL/L (ref 5–15)
BACTERIA SPEC CULT: NORMAL
BUN SERPL-MCNC: 5 MG/DL (ref 6–20)
BUN/CREAT SERPL: 6 (ref 12–20)
CALCIUM SERPL-MCNC: 7.9 MG/DL (ref 8.5–10.1)
CC UR VC: NORMAL
CHLORIDE SERPL-SCNC: 107 MMOL/L (ref 97–108)
CO2 SERPL-SCNC: 25 MMOL/L (ref 21–32)
CREAT SERPL-MCNC: 0.78 MG/DL (ref 0.55–1.02)
ERYTHROCYTE [DISTWIDTH] IN BLOOD BY AUTOMATED COUNT: 12.3 % (ref 11.5–14.5)
GLUCOSE SERPL-MCNC: 101 MG/DL (ref 65–100)
HCT VFR BLD AUTO: 30.9 % (ref 35–47)
HGB BLD-MCNC: 10.3 G/DL (ref 11.5–16)
MCH RBC QN AUTO: 30.6 PG (ref 26–34)
MCHC RBC AUTO-ENTMCNC: 33.3 G/DL (ref 30–36.5)
MCV RBC AUTO: 91.7 FL (ref 80–99)
NRBC # BLD: 0 K/UL (ref 0–0.01)
NRBC BLD-RTO: 0 PER 100 WBC
PLATELET # BLD AUTO: 229 K/UL (ref 150–400)
PMV BLD AUTO: 10.9 FL (ref 8.9–12.9)
POTASSIUM SERPL-SCNC: 3.4 MMOL/L (ref 3.5–5.1)
RBC # BLD AUTO: 3.37 M/UL (ref 3.8–5.2)
SERVICE CMNT-IMP: NORMAL
SODIUM SERPL-SCNC: 137 MMOL/L (ref 136–145)
WBC # BLD AUTO: 7 K/UL (ref 3.6–11)

## 2023-02-28 PROCEDURE — 80048 BASIC METABOLIC PNL TOTAL CA: CPT

## 2023-02-28 PROCEDURE — 85027 COMPLETE CBC AUTOMATED: CPT

## 2023-02-28 PROCEDURE — 74011250637 HC RX REV CODE- 250/637: Performed by: HOSPITALIST

## 2023-02-28 PROCEDURE — 36415 COLL VENOUS BLD VENIPUNCTURE: CPT

## 2023-02-28 PROCEDURE — 74011250636 HC RX REV CODE- 250/636: Performed by: HOSPITALIST

## 2023-02-28 PROCEDURE — 65270000029 HC RM PRIVATE

## 2023-02-28 PROCEDURE — 74011000250 HC RX REV CODE- 250: Performed by: HOSPITALIST

## 2023-02-28 RX ORDER — CEFDINIR 300 MG/1
300 CAPSULE ORAL 2 TIMES DAILY
Qty: 12 CAPSULE | Refills: 0 | Status: SHIPPED | OUTPATIENT
Start: 2023-02-28 | End: 2023-03-06

## 2023-02-28 RX ADMIN — ONDANSETRON 4 MG: 2 INJECTION INTRAMUSCULAR; INTRAVENOUS at 16:12

## 2023-02-28 RX ADMIN — WATER 2 G: 1 INJECTION INTRAMUSCULAR; INTRAVENOUS; SUBCUTANEOUS at 09:29

## 2023-02-28 RX ADMIN — WATER 2 G: 1 INJECTION INTRAMUSCULAR; INTRAVENOUS; SUBCUTANEOUS at 01:24

## 2023-02-28 RX ADMIN — WATER 2 G: 1 INJECTION INTRAMUSCULAR; INTRAVENOUS; SUBCUTANEOUS at 17:28

## 2023-02-28 RX ADMIN — ACETAMINOPHEN 650 MG: 325 TABLET ORAL at 16:48

## 2023-02-28 NOTE — ED NOTES
Bedside shift change report given to Kain Tanner  (oncoming nurse) by Meghan Reid RN  (offgoing nurse). Report included the following information SBAR, ED Summary, MAR, Recent Results and Med Rec Status.

## 2023-02-28 NOTE — PROGRESS NOTES
S This is a 34 y/o F  at 18 weeks gestation admitted for observation in ED for a UTI, fever and gastroenteritis. Presently she says fever and vomiting improved but is still nauseated. O T-98.6  /73 P-108      WD, WN BF in NAD. Abdomen-soft, non-tender FHR in 140's last night      Perineum- reports no bleeding    A 1 Acute UTI in pregnancy. 2 Gastroenteritis in pregnancy. P 1 Continue treatment plan per medical hospitalist with IV ancef and IV fluids. 2 Dr. Azucena Barrow of [facility] briefed on patient and will manage in consultant role.

## 2023-02-28 NOTE — ED NOTES
Patient is medical admission for UTI, most likely to be discharged tomorrow, spoke with L& D charge nurse to see if they had an available bed for admission, she requested I speak with Dr Shade Kaur, Dr Shade Kaur aware of patient and ok with patient going to [facility]

## 2023-02-28 NOTE — PROGRESS NOTES
Hospitalist Progress Note      NAME: Jaycob Mae   :  1995  MRM:  [id number]    Date/Time: 2023  7:50 AM           Assessment / Plan:     28F at 18 weeks pregnant p/w vomiting, diarrhea as well as urinary frequency, found to have UTI. #Sepsis due to UTI: Fever, tachycardia, urinary source. Tachycardia confounded by pregnancy, but still meets criteria. Fever curve downtrending, so will not change abx for now   - cont cefazolin and fu Ucx results   - Continue to monitor fluid responsiveness   - UOP and MAPs to goal    #Viral gastroenteritis: supportive care    #18 weeks gestation: OB following, appreciated. Avoid teratogens                  Care Plan discussed with: Patient, Care Manager, and Nursing Staff    Discussed:  Care Plan    Prophylaxis:  SCD's    Disposition:  Home w/Family           ___________________________________________________    Attending Physician: Laya Isaac MD        Subjective:     Chief Complaint:  Febrile overnight. She feels a little better, but still with vomiting, nausea and general malaise. Not tolerating diet    ROS:  (bold if positive, if negative)            Objective:       Vitals:          Last 24hrs VS reviewed since prior progress note.  Most recent are:    Visit Vitals  /73 (BP 1 Location: Right arm, BP Patient Position: At rest;Lying)   Pulse (!) 108   Temp 98.6 °F (37 °C)   Resp 16   Ht 5' 8\" (1.727 m)   Wt 95.7 kg (211 lb)   SpO2 100%   BMI 32.08 kg/m²     SpO2 Readings from Last 6 Encounters:   23 100%   22 100%   22 100%   02/10/22 100%   02/10/22 100%   02/10/22 100%        No intake or output data in the 24 hours ending 23 0750       Exam:     Physical Exam:    Gen:  Well-developed, well-nourished, in no acute distress but uncomfortable  HEENT:  Pink conjunctivae, PERRL, hearing intact to voice, dry mucous membranes  Neck:  Supple, without masses, thyroid non-tender  Resp:  No accessory muscle use, clear breath sounds without wheezes rales or rhonchi  Card:  No murmurs, normal S1, S2 without thrills, bruits or peripheral edema  Abd:  Soft, non-tender, non-distended, normoactive bowel sounds are present  Musc:  No cyanosis or clubbing  Skin:  No rashes or ulcers, skin turgor is good  Neuro:  Cranial nerves 3-12 are grossly intact,  strength is 5/5 bilaterally and dorsi / plantarflexion is 5/5 bilaterally, follows commands appropriately  Psych:  Good insight, oriented to person, place and time, alert       Medications Reviewed: (see below)    Lab Data Reviewed: (see below)    ______________________________________________________________________    Medications:     Current Facility-Administered Medications   Medication Dose Route Frequency    promethazine (PHENERGAN) tablet 25 mg  25 mg Oral Q6H PRN    sodium chloride (NS) flush 5-40 mL  5-40 mL IntraVENous Q8H    sodium chloride (NS) flush 5-40 mL  5-40 mL IntraVENous PRN    acetaminophen (TYLENOL) tablet 650 mg  650 mg Oral Q6H PRN    Or    acetaminophen (TYLENOL) suppository 650 mg  650 mg Rectal Q6H PRN    polyethylene glycol (MIRALAX) packet 17 g  17 g Oral DAILY PRN    ondansetron (ZOFRAN ODT) tablet 4 mg  4 mg Oral Q8H PRN    Or    ondansetron (ZOFRAN) injection 4 mg  4 mg IntraVENous Q6H PRN    0.9% sodium chloride infusion  75 mL/hr IntraVENous CONTINUOUS    ceFAZolin (ANCEF) 2 g in sterile water (preservative free) 20 mL IV syringe  2 g IntraVENous Q8H     Current Outpatient Medications   Medication Sig    promethazine (PHENERGAN) 25 mg tablet Take 1 Tablet by mouth every six (6) hours as needed for Nausea. cyclobenzaprine (FLEXERIL) 5 mg tablet  (Patient not taking: Reported on 12/1/2021)    ubrogepant (UBRELVY PO) Take  by mouth. ALPRAZolam (XANAX) 0.25 mg tablet Take 1 Tab by mouth four (4) times daily as needed for Anxiety or Sleep. Max Daily Amount: 1 mg.  (Patient not taking: Reported on 12/1/2021)    buPROPion SR (WELLBUTRIN SR) 150 mg SR tablet Take 1 Tab by mouth daily. (Patient not taking: Reported on 12/1/2021)    diphenhydrAMINE (BenadryL) 25 mg capsule Take 2 Caps by mouth every six (6) hours as needed for Itching. (Patient not taking: Reported on 12/1/2021)    omega-3 acid ethyl esters (LOVAZA) 1 gram capsule Take 2 g by mouth two (2) times a day. (Patient not taking: Reported on 12/1/2021)    ascorbic acid, vitamin C, (Vitamin C) 250 mg tablet Take  by mouth. PNV Comb #2-Iron-FA-Omega 3 29-1-400 mg cmpk Take  by mouth.             Lab Review:     Recent Labs     02/28/23  0432 02/27/23  0920   WBC 7.0 9.4   HGB 10.3* 12.1   HCT 30.9* 35.8    269     Recent Labs     02/28/23  0432 02/27/23  0920    135*   K 3.4* 3.6    104   CO2 25 24   * 121*   BUN 5* 7   CREA 0.78 0.67   CA 7.9* 8.8   ALB  --  2.9*   ALT  --  16     No components found for: [place]

## 2023-02-28 NOTE — ED NOTES
TRANSFER - OUT REPORT:    Verbal report given to Christy Bennett RN (name) on Stanley Sers  being transferred to (unit) for routine progression of care       Report consisted of patients Situation, Background, Assessment and   Recommendations(SBAR). Information from the following report(s) SBAR, ED Summary and MAR was reviewed with the receiving nurse. Lines:   Peripheral IV 02/27/23 Right Antecubital (Active)        Opportunity for questions and clarification was provided.       Patient transported with:   tech

## 2023-03-01 VITALS
HEART RATE: 81 BPM | DIASTOLIC BLOOD PRESSURE: 60 MMHG | RESPIRATION RATE: 15 BRPM | BODY MASS INDEX: 31.98 KG/M2 | HEIGHT: 68 IN | SYSTOLIC BLOOD PRESSURE: 109 MMHG | TEMPERATURE: 97.5 F | OXYGEN SATURATION: 97 % | WEIGHT: 211 LBS

## 2023-03-01 PROCEDURE — 74011000250 HC RX REV CODE- 250: Performed by: HOSPITALIST

## 2023-03-01 PROCEDURE — 74011250636 HC RX REV CODE- 250/636: Performed by: HOSPITALIST

## 2023-03-01 PROCEDURE — 74011250637 HC RX REV CODE- 250/637: Performed by: INTERNAL MEDICINE

## 2023-03-01 RX ORDER — SERTRALINE HYDROCHLORIDE 50 MG/1
50 TABLET, FILM COATED ORAL DAILY
COMMUNITY

## 2023-03-01 RX ORDER — ASPIRIN 81 MG/1
81 TABLET ORAL DAILY
COMMUNITY

## 2023-03-01 RX ORDER — ASPIRIN 81 MG/1
81 TABLET ORAL DAILY
Status: DISCONTINUED | OUTPATIENT
Start: 2023-03-01 | End: 2023-03-01 | Stop reason: HOSPADM

## 2023-03-01 RX ORDER — SWAB
1 SWAB, NON-MEDICATED MISCELLANEOUS DAILY
Status: DISCONTINUED | OUTPATIENT
Start: 2023-03-01 | End: 2023-03-01 | Stop reason: HOSPADM

## 2023-03-01 RX ORDER — SERTRALINE HYDROCHLORIDE 50 MG/1
50 TABLET, FILM COATED ORAL DAILY
Status: DISCONTINUED | OUTPATIENT
Start: 2023-03-01 | End: 2023-03-01 | Stop reason: HOSPADM

## 2023-03-01 RX ADMIN — SERTRALINE 50 MG: 50 TABLET, FILM COATED ORAL at 09:34

## 2023-03-01 RX ADMIN — SODIUM CHLORIDE 75 ML/HR: 9 INJECTION, SOLUTION INTRAVENOUS at 02:29

## 2023-03-01 RX ADMIN — WATER 2 G: 1 INJECTION INTRAMUSCULAR; INTRAVENOUS; SUBCUTANEOUS at 02:22

## 2023-03-01 RX ADMIN — Medication 1 TABLET: at 09:43

## 2023-03-01 RX ADMIN — ASPIRIN 81 MG: 81 TABLET, COATED ORAL at 09:34

## 2023-03-01 NOTE — ED NOTES
Assumed care of patient, cardiac leads removed, vitals completed by tech, no pain, call bell within reach and breakfast bedside, iv fluid infusing

## 2023-03-01 NOTE — PROGRESS NOTES
[facility] Progress Note    Kika Rush  Unknown    Patient admitted for  urinary tract infection  Unknown Estimated Date of Delivery: None noted. Feeling better this AM. No more vomiting or diarrhea. Vitals:  Patient Vitals for the past 24 hrs:   BP Temp Pulse Resp SpO2   23 0727 109/60 97.5 °F (36.4 °C) 81 15 97 %   23 0435 105/75 97.9 °F (36.6 °C) 75 16 96 %   23 2322 106/72 98.2 °F (36.8 °C) 91 18 99 %   23 1952 111/75 98.6 °F (37 °C) 80 18 99 %   23 1505 112/74 98.3 °F (36.8 °C) 98 16 98 %   23 1200 114/72 98.4 °F (36.9 °C) 96 16 100 %     Temp (24hrs), Av.2 °F (36.8 °C), Min:97.5 °F (36.4 °C), Max:98.6 °F (37 °C)    I&O:   No intake/output data recorded.  1901 -  0700  In: 2441.3 [P.O.:240; I.V.:2201.3]  Out: -     Exam:  Patient without distress. Abdomen soft, non-tender               Fundus soft and non tender               Lower extremities edema No                                           Labs: No results found for this or any previous visit (from the past 24 hour(s)).     Assessment and Plan:   IUP @ Unknown   Patient Active Problem List    Diagnosis Date Noted    UTI (urinary tract infection) 2023    Viral gastroenteritis 2023    UTI (urinary tract infection) during pregnancy 2023    SVT (supraventricular tachycardia) ([facility]) 2021    Dyspnea 2021    36 weeks gestation of pregnancy 2021    Vaginal bleeding in pregnancy, third trimester 2021    Acute headache 2020    Floaters in visual field, left 2020    28 weeks gestation of pregnancy 2020     - reviewed will need PO meds on discharge with daily macrobid suppression until delivery  - appreciate hospitalist management    Tara Seth MD  [facility]

## 2023-03-01 NOTE — PROGRESS NOTES
Problem: Urinary Tract Infection - Adult  Goal: *Absence of infection signs and symptoms  Outcome: Progressing Towards Goal     Problem: Patient Education: Go to Patient Education Activity  Goal: Patient/Family Education  Outcome: Progressing Towards Goal     Problem: Nausea/Vomiting (Adult)  Goal: *Absence of nausea/vomiting  Outcome: Progressing Towards Goal     Problem: Patient Education: Go to Patient Education Activity  Goal: Patient/Family Education  Outcome: Progressing Towards Goal

## 2023-03-01 NOTE — PROGRESS NOTES
Bedside and Verbal shift change report given to Fany Prince RN (oncoming nurse) by Melinda Green RN (offgoing nurse). Report included the following information SBAR, Kardex, ED Summary, Intake/Output, and MAR.

## 2023-03-01 NOTE — DISCHARGE SUMMARY
Hospitalist Discharge Summary     Patient ID:  Domenica Domínguez  [id number]  41 y.o.  1995    Admit date: 2/27/2023    Discharge date and time: 3/1/2023    Admission Diagnoses: UTI (urinary tract infection) during pregnancy [O23.40]    Discharge Diagnoses:    Principal Problem:    UTI (urinary tract infection) during pregnancy (2/27/2023)    Active Problems:    28 weeks gestation of pregnancy (12/19/2020)      SVT (supraventricular tachycardia) ([facility] [address] (2/11/2021)      Viral gastroenteritis (2/27/2023)           Hospital Course:   28F at 18 weeks pregnant p/w vomiting, diarrhea as well as urinary frequency, found to have UTI. #Sepsis due to UTI: Fever, tachycardia, urinary source on admission. Tachycardia confounded by pregnancy, but still meets criteria. Ucx not significant, but given symptomatology did elect to treat as such. She will complete a course of cefdinir. #Viral gastroenteritis: supportive care     #18 weeks gestation: OB following, appreciated.  Avoid teratogens    PCP: None     Consults: None    Condition of patient at discharge: good and improved    Discharge Exam:    Physical Exam:    Gen: Well-developed, well-nourished, in no acute distress  HEENT:  Pink conjunctivae, PERRL, hearing intact to voice, moist mucous membranes  Neck: Supple, without masses, thyroid non-tender  Resp: No accessory muscle use, clear breath sounds without wheezes rales or rhonchi  Card: No murmurs, normal S1, S2 without thrills, bruits or peripheral edema  Abd:  Soft, non-tender, non-distended, normoactive bowel sounds are present, no palpable organomegaly and no detectable hernias  Lymph:  No cervical or inguinal adenopathy  Musc: No cyanosis or clubbing  Skin: No rashes or ulcers, skin turgor is good  Neuro:  Cranial nerves are grossly intact, no focal motor weakness, follows commands appropriately  Psych:  Good insight, oriented to person, place and time, alert          Disposition: home    Patient Instructions: Current Discharge Medication List        START taking these medications    Details   cefdinir (OMNICEF) 300 mg capsule Take 1 Capsule by mouth two (2) times a day for 6 days. Qty: 12 Capsule, Refills: 0           CONTINUE these medications which have NOT CHANGED    Details   sertraline (Zoloft) 50 mg tablet Take 50 mg by mouth daily. aspirin delayed-release 81 mg tablet Take 81 mg by mouth daily. promethazine (PHENERGAN) 25 mg tablet Take 1 Tablet by mouth every six (6) hours as needed for Nausea. Qty: 12 Tablet, Refills: 0      ubrogepant (UBRELVY PO) Take  by mouth. ascorbic acid, vitamin C, (Vitamin C) 250 mg tablet Take  by mouth. PNV Comb #2-Iron-FA-Omega 3 29-1-400 mg cmpk Take  by mouth. STOP taking these medications       cyclobenzaprine (FLEXERIL) 5 mg tablet Comments:   Reason for Stopping:         ALPRAZolam (XANAX) 0.25 mg tablet Comments:   Reason for Stopping:         buPROPion SR (WELLBUTRIN SR) 150 mg SR tablet Comments:   Reason for Stopping:         diphenhydrAMINE (BenadryL) 25 mg capsule Comments:   Reason for Stopping:         omega-3 acid ethyl esters (LOVAZA) 1 gram capsule Comments:   Reason for Stopping:             Activity: Activity as tolerated  Diet: Regular Diet  Wound Care: None needed    Approximate time spent in patient care on day of discharge: 35 min    Signed:   Janny Rodriguez MD  3/1/2023  8:41 AM

## 2023-03-04 LAB
BACTERIA SPEC CULT: NORMAL
SERVICE CMNT-IMP: NORMAL

## 2023-03-29 PROBLEM — N39.0 UTI (URINARY TRACT INFECTION): Status: RESOLVED | Noted: 2023-02-27 | Resolved: 2023-03-29
